# Patient Record
Sex: FEMALE | Race: WHITE | Employment: OTHER | URBAN - METROPOLITAN AREA
[De-identification: names, ages, dates, MRNs, and addresses within clinical notes are randomized per-mention and may not be internally consistent; named-entity substitution may affect disease eponyms.]

---

## 2017-01-27 ENCOUNTER — TELEPHONE (OUTPATIENT)
Dept: INTERNAL MEDICINE CLINIC | Age: 67
End: 2017-01-27

## 2017-01-27 DIAGNOSIS — Z11.59 NEED FOR HEPATITIS C SCREENING TEST: ICD-10-CM

## 2017-01-27 DIAGNOSIS — E78.00 PURE HYPERCHOLESTEROLEMIA: Primary | ICD-10-CM

## 2017-01-27 NOTE — TELEPHONE ENCOUNTER
Pt stated she would like an order for lab work mailed to her address on file before her appt on 02/20/17.  Best contact number [phone].        From answering service

## 2017-02-14 LAB
ALBUMIN SERPL-MCNC: 4.2 G/DL (ref 3.6–4.8)
ALBUMIN/GLOB SERPL: 1.9 {RATIO} (ref 1.1–2.5)
ALP SERPL-CCNC: 66 IU/L (ref 39–117)
ALT SERPL-CCNC: 23 IU/L (ref 0–32)
AST SERPL-CCNC: 23 IU/L (ref 0–40)
BILIRUB SERPL-MCNC: 0.6 MG/DL (ref 0–1.2)
BUN SERPL-MCNC: 11 MG/DL (ref 8–27)
BUN/CREAT SERPL: 22 (ref 11–26)
CALCIUM SERPL-MCNC: 9.2 MG/DL (ref 8.7–10.3)
CHLORIDE SERPL-SCNC: 99 MMOL/L (ref 96–106)
CHOLEST SERPL-MCNC: 170 MG/DL (ref 100–199)
CO2 SERPL-SCNC: 22 MMOL/L (ref 18–29)
CREAT SERPL-MCNC: 0.49 MG/DL (ref 0.57–1)
GLOBULIN SER CALC-MCNC: 2.2 G/DL (ref 1.5–4.5)
GLUCOSE SERPL-MCNC: 92 MG/DL (ref 65–99)
HCV AB S/CO SERPL IA: <0.1 S/CO RATIO (ref 0–0.9)
HDLC SERPL-MCNC: 77 MG/DL
INTERPRETATION, 910389: NORMAL
LDLC SERPL CALC-MCNC: 81 MG/DL (ref 0–99)
POTASSIUM SERPL-SCNC: 4.2 MMOL/L (ref 3.5–5.2)
PROT SERPL-MCNC: 6.4 G/DL (ref 6–8.5)
SODIUM SERPL-SCNC: 138 MMOL/L (ref 134–144)
TRIGL SERPL-MCNC: 62 MG/DL (ref 0–149)
VLDLC SERPL CALC-MCNC: 12 MG/DL (ref 5–40)

## 2017-02-20 ENCOUNTER — OFFICE VISIT (OUTPATIENT)
Dept: INTERNAL MEDICINE CLINIC | Age: 67
End: 2017-02-20

## 2017-02-20 VITALS
BODY MASS INDEX: 23.87 KG/M2 | WEIGHT: 139.8 LBS | HEART RATE: 71 BPM | RESPIRATION RATE: 17 BRPM | OXYGEN SATURATION: 98 % | TEMPERATURE: 98.6 F | SYSTOLIC BLOOD PRESSURE: 118 MMHG | HEIGHT: 64 IN | DIASTOLIC BLOOD PRESSURE: 72 MMHG

## 2017-02-20 DIAGNOSIS — N30.01 ACUTE CYSTITIS WITH HEMATURIA: ICD-10-CM

## 2017-02-20 DIAGNOSIS — E78.00 PURE HYPERCHOLESTEROLEMIA: Primary | ICD-10-CM

## 2017-02-20 LAB
BILIRUB UR QL STRIP: NEGATIVE
GLUCOSE UR-MCNC: NEGATIVE MG/DL
KETONES P FAST UR STRIP-MCNC: NEGATIVE MG/DL
PH UR STRIP: 6 [PH] (ref 4.6–8)
PROT UR QL STRIP: NEGATIVE MG/DL
SP GR UR STRIP: 1 (ref 1–1.03)
UA UROBILINOGEN AMB POC: NORMAL (ref 0.2–1)
URINALYSIS CLARITY POC: CLEAR
URINALYSIS COLOR POC: YELLOW
URINE BLOOD POC: NORMAL
URINE LEUKOCYTES POC: NEGATIVE
URINE NITRITES POC: NEGATIVE

## 2017-02-20 NOTE — MR AVS SNAPSHOT
Visit Information Date & Time Provider Department Dept. Phone Encounter #  
 2/20/2017 10:00 AM Arturo Billingsley MD [facility] [phone] [id number] Follow-up Instructions Return in about 6 months (around 8/20/2017) for hyperlipidemia. Follow-up and Disposition History Upcoming Health Maintenance Date Due  
 GLAUCOMA SCREENING Q2Y 7/9/2016 MEDICARE YEARLY EXAM 7/9/2016 COLONOSCOPY 2/15/2017 Pneumococcal 65+ Low/Medium Risk (2 of 2 - PCV13) 8/2/2017 BREAST CANCER SCRN MAMMOGRAM 10/10/2018 DTaP/Tdap/Td series (2 - Td) 8/5/2021 Allergies as of 2/20/2017  Review Complete On: 2/20/2017 By: Arturo Billingsley MD  
  
 Severity Noted Reaction Type Reactions Merthiolate (Thimerosal)  08/09/2012    Unknown (comments) Current Immunizations  Reviewed on 8/2/2016 Name Date Influenza Vaccine 10/15/2014, 10/22/2013 Pneumococcal Polysaccharide (PPSV-23) 8/2/2016 TDAP Vaccine 8/5/2011 Varicella Virus Vaccine Live 11/11/2011 Not reviewed this visit You Were Diagnosed With   
  
 Codes Comments Pure hypercholesterolemia    -  Primary ICD-10-CM: E78.00 ICD-9-CM: 272.0 Acute cystitis with hematuria     ICD-10-CM: N30.01 
ICD-9-CM: 595.0 Vitals BP Pulse Temp Resp Height(growth percentile) Weight(growth percentile) 118/72 (BP 1 Location: Left arm, BP Patient Position: Sitting) 71 98.6 °F (37 °C) (Oral) 17 5' 3.5\" (1.613 m) 139 lb 12.8 oz (63.4 kg) SpO2 BMI OB Status Smoking Status 98% 24.38 kg/m2 Postmenopausal Never Smoker Vitals History BMI and BSA Data Body Mass Index Body Surface Area  
 24.38 kg/m 2 1.69 m 2 Your Updated Medication List  
  
   
This list is accurate as of: 2/20/17 10:51 AM.  Always use your most recent med list.  
  
  
  
  
 B.infantis-B.ani-B.long-B.bifi 10-15 mg Tbec Take  by mouth.  
  
 bimatoprost 0.03 % eyelash solution Commonly known as:  LATISSE  
1 Drop by Base of Eyelashes route nightly. cetirizine 10 mg tablet Commonly known as:  ZYRTEC Take 1 Tab by mouth daily. fluticasone 50 mcg/actuation nasal spray Commonly known as:  FLONASE  
2 sprays by Both Nostrils route daily. melatonin Tab tablet Take  by mouth. MULTIVITAMIN PO Take  by mouth daily. NORITATE 1 % topical cream  
Generic drug:  metroNIDAZOLE Apply  to affected area as directed. Use a thin layer to affected areas after washing  
  
 raNITIdine 75 mg tablet Commonly known as:  ZANTAC Take 75 mg by mouth two (2) times a day. simvastatin 40 mg tablet Commonly known as:  ZOCOR  
TAKE 1 BY MOUTH NIGHTLY  
  
 spironolactone 100 mg tablet Commonly known as:  ALDACTONE Take 100 mg by mouth daily. VALTREX 500 mg tablet Generic drug:  valACYclovir Take 500 mg by mouth as needed. VANIQA 13.9 % topical cream  
Generic drug:  eflornithine Apply  to affected area as directed. apply thin layer to affected areas space application by 8 hour VITAMIN D3 1,000 unit Cap Generic drug:  cholecalciferol Take  by mouth. Taking 2 tablets 3 days a week and 1 tablet 4 days a week. We Performed the Following HEPATIC FUNCTION PANEL [35012 CPT(R)] LIPID PANEL [89003 CPT(R)] Follow-up Instructions Return in about 6 months (around 8/20/2017) for hyperlipidemia. [facility]! Dear Lea Nguyen: Thank you for requesting a USMD account. Our records indicate that you already have an active USMD account. You can access your account anytime at [email or web address]. Did you know that you can access your hospital and ER discharge instructions at any time in USMD? You can also review all of your test results from your hospital stay or ER visit. Additional Information If you have questions, please visit the Frequently Asked Questions section of the Boomrt website at [email or web address] Remember, Coiney is NOT to be used for urgent needs. For medical emergencies, dial 911. Now available from your iPhone and Android! Please provide this summary of care documentation to your next provider. Your primary care clinician is listed as ARCHIE NOYOLA. If you have any questions after today's visit, please call [phone].

## 2017-02-20 NOTE — PROGRESS NOTES
Results for orders placed or performed in visit on 02/20/17   AMB POC URINALYSIS DIP STICK AUTO W/O MICRO   Result Value Ref Range    Color (UA POC) Yellow     Clarity (UA POC) Clear     Glucose (UA POC) Negative Negative    Bilirubin (UA POC) Negative Negative    Ketones (UA POC) Negative Negative    Specific gravity (UA POC) 1.005 1.001 - 1.035    Blood (UA POC) Trace Negative    pH (UA POC) 6.0 4.6 - 8.0    Protein (UA POC) Negative Negative mg/dL    Urobilinogen (UA POC) 0.2 mg/dL 0.2 - 1    Nitrites (UA POC) Negative Negative    Leukocyte esterase (UA POC) Negative Negative

## 2017-02-20 NOTE — PROGRESS NOTES
Subjective:   Charlotte Funes is a 73 y.o. female who is seen for follow up of hyperlipidemia. Cardiovascular risk analysis - 73 y.o. female LDL goal is under 130. Compliance with treatment thus far has been good. ROS: taking medications as instructed, no medication side effects noted, no TIA's, no chest pain on exertion, no dyspnea on exertion, no swelling of ankles. New concerns:   Treated for UTI 2/10 at [facility].  E. Coli, given 7 days macrobid. Repeat urine today is normal.  Since then still with increased frequency. Nocturia x 1. No burning. .     Current Outpatient Prescriptions   Medication Sig Dispense Refill    ranitidine (ZANTAC) 75 mg tablet Take 75 mg by mouth two (2) times a day.  B.infantis-B.ani-B.long-B.bifi 10-15 mg TbEC Take  by mouth.  bimatoprost (LATISSE) 0.03 % eyelash solution 1 Drop by Base of Eyelashes route nightly.  simvastatin (ZOCOR) 40 mg tablet TAKE 1 BY MOUTH NIGHTLY 90 Tab 3    spironolactone (ALDACTONE) 100 mg tablet Take 100 mg by mouth daily. 1    fluticasone (FLONASE) 50 mcg/actuation nasal spray 2 sprays by Both Nostrils route daily. 3 Bottle 3    Cholecalciferol, Vitamin D3, (VITAMIN D3) 1,000 unit cap Take  by mouth. Taking 2 tablets 3 days a week and 1 tablet 4 days a week.  melatonin 5 mg Tab Take  by mouth.  cetirizine (ZYRTEC) 10 mg tablet Take 1 Tab by mouth daily. 30 Tab 0    valacyclovir (VALTREX) 500 mg tablet Take 500 mg by mouth as needed.  MULTIVITAMIN PO Take  by mouth daily.  metronidazole (NORITATE) 1 % topical cream Apply  to affected area as directed. Use a thin layer to affected areas after washing       eflornithine (VANIQA) 13.9 % topical cream Apply  to affected area as directed.  apply thin layer to affected areas space application by 8 hour         Lab Results   Component Value Date/Time    Cholesterol, total 170 01/27/2017 12:00 AM    HDL Cholesterol 77 01/27/2017 12:00 AM    LDL, calculated 81 01/27/2017 12:00 AM    Triglyceride 62 01/27/2017 12:00 AM       Lab Results   Component Value Date/Time    ALT (SGPT) 23 01/27/2017 12:00 AM    AST (SGOT) 23 01/27/2017 12:00 AM    Alk. phosphatase 66 01/27/2017 12:00 AM    Bilirubin, direct 0.19 08/18/2016 12:00 AM    Bilirubin, total 0.6 01/27/2017 12:00 AM       Lab Results   Component Value Date/Time    GFR est  01/27/2017 12:00 AM    GFR est non- 01/27/2017 12:00 AM    Creatinine 0.49 01/27/2017 12:00 AM    BUN 11 01/27/2017 12:00 AM    Sodium 138 01/27/2017 12:00 AM    Potassium 4.2 01/27/2017 12:00 AM    Chloride 99 01/27/2017 12:00 AM    CO2 22 01/27/2017 12:00 AM         Objective:     Visit Vitals    /72 (BP 1 Location: Left arm, BP Patient Position: Sitting)    Pulse 71    Temp 98.6 °F (37 °C) (Oral)    Resp 17    Ht 5' 3.5\" (1.613 m)    Wt 139 lb 12.8 oz (63.4 kg)    SpO2 98%    BMI 24.38 kg/m2      Appearance: alert, well appearing, and in no distress and oriented to person, place, and time. CVS exam   NECK: supple, no lad, no bruit, no tm  LUNGS: cta bilat  CV rrr, no m/g/r  ABD: soft, nt, nd, nabs  EXT: no c/c/e. Assessment/Plan:   Hyperlipidemia well controlled. current treatment plan is effective, no change in therapy. Recent UTI - resolved. ? Some OAB symptoms. Discussed. Does not want medications at this time. Orders Placed This Encounter    LIPID PANEL    HEPATIC FUNCTION PANEL     Follow-up Disposition:  Return in about 6 months (around 8/20/2017) for hyperlipidemia. Frank Saini

## 2017-03-07 RX ORDER — SIMVASTATIN 40 MG/1
TABLET, FILM COATED ORAL
Qty: 90 TAB | Refills: 3 | Status: SHIPPED | OUTPATIENT
Start: 2017-03-07 | End: 2018-01-08 | Stop reason: SDUPTHER

## 2017-07-19 RX ORDER — VALACYCLOVIR HYDROCHLORIDE 500 MG/1
500 TABLET, FILM COATED ORAL AS NEEDED
Qty: 90 TAB | Refills: 0 | Status: SHIPPED | OUTPATIENT
Start: 2017-07-19 | End: 2017-11-01 | Stop reason: SDUPTHER

## 2017-07-19 NOTE — TELEPHONE ENCOUNTER
Last seen: 02/20/2017    Requested Prescriptions     Pending Prescriptions Disp Refills    valACYclovir (VALTREX) 500 mg tablet       Sig: Take 1 Tab by mouth as needed.

## 2017-08-08 ENCOUNTER — OFFICE VISIT (OUTPATIENT)
Dept: INTERNAL MEDICINE CLINIC | Age: 67
End: 2017-08-08

## 2017-08-08 VITALS
DIASTOLIC BLOOD PRESSURE: 83 MMHG | WEIGHT: 129 LBS | HEIGHT: 63 IN | HEART RATE: 68 BPM | OXYGEN SATURATION: 97 % | BODY MASS INDEX: 22.86 KG/M2 | SYSTOLIC BLOOD PRESSURE: 129 MMHG | RESPIRATION RATE: 12 BRPM | TEMPERATURE: 97.9 F

## 2017-08-08 DIAGNOSIS — Z00.00 ROUTINE GENERAL MEDICAL EXAMINATION AT A HEALTH CARE FACILITY: Primary | ICD-10-CM

## 2017-08-08 RX ORDER — AZELASTINE 1 MG/ML
1 SPRAY, METERED NASAL 2 TIMES DAILY
COMMUNITY
End: 2018-02-14 | Stop reason: SDUPTHER

## 2017-08-08 NOTE — PROGRESS NOTES
Subjective:   78 y.o. female for Well Woman Check. Her gyne and breast care is done elsewhere by her Ob-Gyne physician. Last pap Feb or March 2017. Due colonoscopy in November - [facility] has retired, to have with Dr. Shelley Pedraza  UTSABINO mammogram and DXA fall 2016. Patient Active Problem List    Diagnosis Date Noted    Hyperlipidemia 11/11/2011    HSV (herpes simplex virus) infection 10/19/2011    Menopause 10/19/2011    Osteopenia 10/19/2011    Rosacea 10/19/2011     Current Outpatient Prescriptions   Medication Sig Dispense Refill    azelastine (ASTELIN) 137 mcg (0.1 %) nasal spray 1 Spray two (2) times a day. Use in each nostril as directed      valACYclovir (VALTREX) 500 mg tablet Take 1 Tab by mouth as needed. 90 Tab 0    simvastatin (ZOCOR) 40 mg tablet TAKE 1 BY MOUTH NIGHTLY 90 Tab 3    bimatoprost (LATISSE) 0.03 % eyelash solution 1 Drop by Base of Eyelashes route nightly.  spironolactone (ALDACTONE) 100 mg tablet Take 100 mg by mouth daily. 1    Cholecalciferol, Vitamin D3, (VITAMIN D3) 1,000 unit cap Take  by mouth. Taking 2 tablets 3 days a week and 1 tablet 4 days a week.  melatonin 5 mg Tab Take 3 mg by mouth.  cetirizine (ZYRTEC) 10 mg tablet Take 1 Tab by mouth daily. 30 Tab 0    MULTIVITAMIN PO Take  by mouth daily.  metronidazole (NORITATE) 1 % topical cream Apply  to affected area as directed. Use a thin layer to affected areas after washing       eflornithine (VANIQA) 13.9 % topical cream Apply  to affected area as directed.  apply thin layer to affected areas space application by 8 hour        Allergies   Allergen Reactions    Merthiolate (Thimerosal) Unknown (comments)     Past Medical History:   Diagnosis Date    Allergic rhinitis     GERD (gastroesophageal reflux disease)     HSV infection     Hypercholesterolemia     Osteopenia     Rosacea     Vitamin D deficiency      Past Surgical History:   Procedure Laterality Date    ENDOSCOPY, COLON, DIAGNOSTIC  2/2007, 2013    (Paige)-f/u 5 yrs. Family History   Problem Relation Age of Onset    Heart Disease Mother 48     CAD   Mammie Muster Hypertension Mother     Elevated Lipids Mother     High Cholesterol Mother     Heart Disease Father 48     CAD    Hypertension Father     Elevated Lipids Father     High Cholesterol Father     Liver Disease Paternal Grandfather      hemachromatosis     Social History   Substance Use Topics    Smoking status: Never Smoker    Smokeless tobacco: Never Used    Alcohol use 4.2 oz/week     7 Standard drinks or equivalent per week        Specific concerns today:   Had severe acid reflux. Cut out ETOH and GERD resolved. Was able to stop Zantac. Will now occ have 2 glasses of wine. Can awaken at night with reflux up esophagus. .    Review of Systems  Constitutional: negative except for weight loss with decreased wine  Eyes: negative  Ears, nose, mouth, throat, and face: negative  Respiratory: negative  Cardiovascular: negative  Gastrointestinal: negative except for reflux symptoms  Genitourinary:negative except for mild urge incontinence  Integument/breast: negative  Hematologic/lymphatic: negative  Musculoskeletal:negative  Neurological: negative  Behavioral/Psych: negative except for insomnia - melatonin helps. up at 3am to urinate at night - sometimes issues falling back asleep. Endocrine: negative  Allergic/Immunologic: negative except for allergies controlled with current medications. Objective:   Blood pressure 129/83, pulse 68, temperature 97.9 °F (36.6 °C), temperature source Oral, resp. rate 12, height 5' 3\" (1.6 m), weight 129 lb (58.5 kg), SpO2 97 %.    Physical Examination:   General appearance - alert, well appearing, and in no distress and oriented to person, place, and time  Mental status - alert, oriented to person, place, and time, normal mood, behavior, speech, dress, motor activity, and thought processes  Eyes - pupils equal and reactive, extraocular eye movements intact  Ears - bilateral TM's and external ear canals normal  Nose - normal and patent, no erythema, discharge or polyps  Mouth - mucous membranes moist, pharynx normal without lesions  Neck - supple, no significant adenopathy, carotids upstroke normal bilaterally, no bruits, thyroid exam: thyroid is normal in size without nodules or tenderness  Lymphatics - no palpable lymphadenopathy, no hepatosplenomegaly  Chest - clear to auscultation, no wheezes, rales or rhonchi, symmetric air entry  Heart - normal rate, regular rhythm, normal S1, S2, no murmurs, rubs, clicks or gallops  Abdomen - soft, nontender, nondistended, no masses or organomegaly  bowel sounds normal  Breasts - breasts appear normal, no suspicious masses, no skin or nipple changes or axillary nodes  Back exam - full range of motion, no tenderness, palpable spasm or pain on motion  Neurological - alert, oriented, normal speech, no focal findings or movement disorder noted  Musculoskeletal - no joint tenderness, deformity or swelling  Extremities - peripheral pulses normal, no pedal edema, no clubbing or cyanosis  Skin - normal coloration and turgor, no rashes, no suspicious skin lesions noted     Assessment/Plan:   79 yo female  Hyperlipidemia- controlled, cont same  GERD - discussed zantac prn, elevating head of bed and waiting 2-3 hours post eating to lay down. Allergic rhinitis - controlled with zyrtec daily and astelin ns prn. HM - check labs, rx for prevnar given. Colonoscopy in November.     call if any problems  Orders Placed This Encounter    METABOLIC PANEL, COMPREHENSIVE    LIPID PANEL    CBC W/O DIFF    VITAMIN D, 25 HYDROXY    TSH 3RD GENERATION    azelastine (ASTELIN) 137 mcg (0.1 %) nasal spray    pneumococcal 13 raeann conj dip (PREVNAR 13, PF,) 0.5 mL syrg injection     Follow-up Disposition: Not on File

## 2017-08-08 NOTE — PROGRESS NOTES
Ivon Gar is a 78 y.o. female who presents today physical     Chief Complaint   Patient presents with    Complete Physical         1. Have you been to the ER, urgent care clinic since your last visit? Hospitalized since your last visit? No     2. Have you seen or consulted any other health care providers outside of the [facility] since your last visit? Include any pap smears or colon screening.  No

## 2017-08-08 NOTE — MR AVS SNAPSHOT
Visit Information Date & Time Provider Department Dept. Phone Encounter #  
 8/8/2017  3:20 PM [address], MD [facility] [phone] [id number] Upcoming Health Maintenance Date Due  
 GLAUCOMA SCREENING Q2Y 7/9/2016 MEDICARE YEARLY EXAM 7/9/2016 COLONOSCOPY 2/15/2017 INFLUENZA AGE 9 TO ADULT 8/1/2017 Pneumococcal 65+ Low/Medium Risk (2 of 2 - PCV13) 8/2/2017 BREAST CANCER SCRN MAMMOGRAM 10/10/2018 DTaP/Tdap/Td series (2 - Td) 8/5/2021 Allergies as of 8/8/2017  Review Complete On: 8/8/2017 By: [address], [place]  
  
 Severity Noted Reaction Type Reactions Merthiolate (Thimerosal)  08/09/2012    Unknown (comments) Current Immunizations  Reviewed on 8/2/2016 Name Date Influenza Vaccine 10/15/2014, 10/22/2013 Pneumococcal Polysaccharide (PPSV-23) 8/2/2016 TDAP Vaccine 8/5/2011 Varicella Virus Vaccine Live 11/11/2011 Not reviewed this visit You Were Diagnosed With   
  
 Codes Comments Routine general medical examination at a health care facility    -  Primary ICD-10-CM: Z00.00 ICD-9-CM: V70.0 Vitals BP Pulse Temp Resp Height(growth percentile) Weight(growth percentile) 129/83 68 97.9 °F (36.6 °C) (Oral) 12 5' 3\" (1.6 m) 129 lb (58.5 kg) SpO2 BMI OB Status Smoking Status 97% 22.85 kg/m2 Postmenopausal Never Smoker Vitals History BMI and BSA Data Body Mass Index Body Surface Area  
 22.85 kg/m 2 1.61 m 2 [facility] [address] [facility] [phone] Your Updated Medication List  
  
   
This list is accurate as of: 8/8/17  4:07 PM.  Always use your most recent med list.  
  
  
  
  
 azelastine 137 mcg (0.1 %) nasal spray Commonly known as:  ASTELIN  
1 Spray two (2) times a day. Use in each nostril as directed  
  
 bimatoprost 0.03 % eyelash solution Commonly known as:  LATISSE  
1 Drop by Base of Eyelashes route nightly. cetirizine 10 mg tablet Commonly known as:  ZYRTEC Take 1 Tab by mouth daily. melatonin Tab tablet Take 3 mg by mouth. MULTIVITAMIN PO Take  by mouth daily. NORITATE 1 % topical cream  
Generic drug:  metroNIDAZOLE Apply  to affected area as directed. Use a thin layer to affected areas after washing  
  
 pneumococcal 13 raeann conj dip 0.5 mL Syrg injection Commonly known as:  PREVNAR 13 (PF)  
0.5 mL by IntraMUSCular route PRIOR TO DISCHARGE for 1 dose. simvastatin 40 mg tablet Commonly known as:  ZOCOR  
TAKE 1 BY MOUTH NIGHTLY  
  
 spironolactone 100 mg tablet Commonly known as:  ALDACTONE Take 100 mg by mouth daily. valACYclovir 500 mg tablet Commonly known as:  VALTREX Take 1 Tab by mouth as needed. VANIQA 13.9 % topical cream  
Generic drug:  eflornithine Apply  to affected area as directed. apply thin layer to affected areas space application by 8 hour VITAMIN D3 1,000 unit Cap Generic drug:  cholecalciferol Take  by mouth. Taking 2 tablets 3 days a week and 1 tablet 4 days a week. Prescriptions Printed Refills  
 pneumococcal 13 raeann conj dip (PREVNAR 13, PF,) 0.5 mL syrg injection 0 Si.5 mL by IntraMUSCular route PRIOR TO DISCHARGE for 1 dose. Class: Print Route: IntraMUSCular We Performed the Following CBC W/O DIFF [60468 CPT(R)] LIPID PANEL [23752 CPT(R)] METABOLIC PANEL, COMPREHENSIVE [36940 CPT(R)] TSH 3RD GENERATION [43575 CPT(R)] VITAMIN D, 25 HYDROXY [id number] CPT(R)] [facility]! Dear Miracle Holt: Thank you for requesting a WISHCLOUDS account. Our records indicate that you already have an active WISHCLOUDS account. You can access your account anytime at [email or web address] Did you know that you can access your hospital and ER discharge instructions at any time in 24tidy? You can also review all of your test results from your hospital stay or ER visit. Additional Information If you have questions, please visit the Frequently Asked Questions section of the 24tidy website at [email or web address] Remember, 24tidy is NOT to be used for urgent needs. For medical emergencies, dial 911. Now available from your iPhone and Android! Please provide this summary of care documentation to your next provider. Your primary care clinician is listed as ARCHIE NOYOLA. If you have any questions after today's visit, please call [phone].

## 2017-08-17 LAB
25(OH)D3+25(OH)D2 SERPL-MCNC: 37.2 NG/ML (ref 30–100)
ALBUMIN SERPL-MCNC: 4.4 G/DL (ref 3.6–4.8)
ALBUMIN/GLOB SERPL: 2.1 {RATIO} (ref 1.2–2.2)
ALP SERPL-CCNC: 74 IU/L (ref 39–117)
ALT SERPL-CCNC: 26 IU/L (ref 0–32)
AST SERPL-CCNC: 22 IU/L (ref 0–40)
BILIRUB SERPL-MCNC: 0.8 MG/DL (ref 0–1.2)
BUN SERPL-MCNC: 13 MG/DL (ref 8–27)
BUN/CREAT SERPL: 23 (ref 12–28)
CALCIUM SERPL-MCNC: 9.5 MG/DL (ref 8.7–10.3)
CHLORIDE SERPL-SCNC: 96 MMOL/L (ref 96–106)
CHOLEST SERPL-MCNC: 192 MG/DL (ref 100–199)
CO2 SERPL-SCNC: 23 MMOL/L (ref 18–29)
CREAT SERPL-MCNC: 0.56 MG/DL (ref 0.57–1)
ERYTHROCYTE [DISTWIDTH] IN BLOOD BY AUTOMATED COUNT: 14 % (ref 12.3–15.4)
GLOBULIN SER CALC-MCNC: 2.1 G/DL (ref 1.5–4.5)
GLUCOSE SERPL-MCNC: 88 MG/DL (ref 65–99)
HCT VFR BLD AUTO: 42.2 % (ref 34–46.6)
HDLC SERPL-MCNC: 79 MG/DL
HGB BLD-MCNC: 14.2 G/DL (ref 11.1–15.9)
INTERPRETATION, 910389: NORMAL
LDLC SERPL CALC-MCNC: 93 MG/DL (ref 0–99)
MCH RBC QN AUTO: 31.3 PG (ref 26.6–33)
MCHC RBC AUTO-ENTMCNC: 33.6 G/DL (ref 31.5–35.7)
MCV RBC AUTO: 93 FL (ref 79–97)
PLATELET # BLD AUTO: 218 X10E3/UL (ref 150–379)
POTASSIUM SERPL-SCNC: 4.3 MMOL/L (ref 3.5–5.2)
PROT SERPL-MCNC: 6.5 G/DL (ref 6–8.5)
RBC # BLD AUTO: 4.54 X10E6/UL (ref 3.77–5.28)
SODIUM SERPL-SCNC: 135 MMOL/L (ref 134–144)
TRIGL SERPL-MCNC: 99 MG/DL (ref 0–149)
TSH SERPL DL<=0.005 MIU/L-ACNC: 3.13 UIU/ML (ref 0.45–4.5)
VLDLC SERPL CALC-MCNC: 20 MG/DL (ref 5–40)
WBC # BLD AUTO: 4.1 X10E3/UL (ref 3.4–10.8)

## 2017-10-20 ENCOUNTER — HOSPITAL ENCOUNTER (OUTPATIENT)
Dept: MAMMOGRAPHY | Age: 67
Discharge: HOME | End: 2017-10-20
Attending: OBSTETRICS & GYNECOLOGY
Payer: COMMERCIAL

## 2017-10-20 DIAGNOSIS — Z12.31 VISIT FOR SCREENING MAMMOGRAM: ICD-10-CM

## 2017-10-20 PROCEDURE — 77067 SCR MAMMO BI INCL CAD: CPT

## 2017-11-01 RX ORDER — VALACYCLOVIR HYDROCHLORIDE 500 MG/1
500 TABLET, FILM COATED ORAL 2 TIMES DAILY
Qty: 90 TAB | Refills: 0 | Status: SHIPPED | OUTPATIENT
Start: 2017-11-01 | End: 2017-11-04

## 2018-01-08 RX ORDER — FINASTERIDE 5 MG/1
TABLET, FILM COATED ORAL
Refills: 3 | COMMUNITY
Start: 2017-10-31 | End: 2018-01-08 | Stop reason: SDUPTHER

## 2018-01-08 NOTE — TELEPHONE ENCOUNTER
Last seen: 08/08/2017      Requested Prescriptions     Pending Prescriptions Disp Refills    simvastatin (ZOCOR) 40 mg tablet 90 Tab 3    finasteride (PROSCAR) 5 mg tablet 90 Tab 3     Sig: TK 1 T PO  D

## 2018-01-09 RX ORDER — SIMVASTATIN 40 MG/1
TABLET, FILM COATED ORAL
Qty: 90 TAB | Refills: 3 | Status: SHIPPED | OUTPATIENT
Start: 2018-01-09 | End: 2018-01-15 | Stop reason: SDUPTHER

## 2018-01-09 RX ORDER — FINASTERIDE 5 MG/1
TABLET, FILM COATED ORAL
Qty: 90 TAB | Refills: 3 | Status: SHIPPED | OUTPATIENT
Start: 2018-01-09 | End: 2018-01-15 | Stop reason: SDUPTHER

## 2018-02-14 ENCOUNTER — OFFICE VISIT (OUTPATIENT)
Dept: INTERNAL MEDICINE CLINIC | Age: 68
End: 2018-02-14

## 2018-02-14 VITALS
WEIGHT: 137.2 LBS | TEMPERATURE: 98 F | RESPIRATION RATE: 17 BRPM | SYSTOLIC BLOOD PRESSURE: 129 MMHG | HEART RATE: 74 BPM | DIASTOLIC BLOOD PRESSURE: 72 MMHG | HEIGHT: 63 IN | OXYGEN SATURATION: 98 % | BODY MASS INDEX: 24.31 KG/M2

## 2018-02-14 DIAGNOSIS — E78.00 PURE HYPERCHOLESTEROLEMIA: Primary | ICD-10-CM

## 2018-02-14 RX ORDER — SIMVASTATIN 40 MG/1
TABLET, FILM COATED ORAL
Refills: 3 | COMMUNITY
Start: 2018-02-01 | End: 2018-10-25 | Stop reason: SDUPTHER

## 2018-02-14 RX ORDER — AZELASTINE 1 MG/ML
1 SPRAY, METERED NASAL 2 TIMES DAILY
Qty: 1 BOTTLE | Refills: 11 | Status: SHIPPED | OUTPATIENT
Start: 2018-02-14 | End: 2020-09-04 | Stop reason: ALTCHOICE

## 2018-02-14 NOTE — MR AVS SNAPSHOT
[address] [place] [address] [address] [facility] 
[phone] Patient: Harlie Dakin MRN: [id number] QPO:9/9/7070 Visit Information Date & Time Provider Department Dept. Phone Encounter #  
 2/14/2018 12:40 PM Manuel Membreno MD [facility] [phone] [id number] Your Appointments 8/13/2018  9:20 AM  
COMPLETE 40 with Manuel Membreno MD  
[facility] [address]) Appt Note: [address] [facility] [address]  
[facility] [address] [facility] [address] [facility] [phone] Upcoming Health Maintenance Date Due  
 GLAUCOMA SCREENING Q2Y 7/9/2016 MEDICARE YEARLY EXAM 7/9/2016 Influenza Age 5 to Adult 8/1/2017 Pneumococcal 65+ Low/Medium Risk (2 of 2 - PCV13) 8/2/2017 BREAST CANCER SCRN MAMMOGRAM 10/20/2019 DTaP/Tdap/Td series (2 - Td) 8/5/2021 COLONOSCOPY 11/13/2022 Allergies as of 2/14/2018  Review Complete On: 2/14/2018 By: Manuel Membreno MD  
  
 Severity Noted Reaction Type Reactions Merthiolate (Thimerosal)  08/09/2012    Unknown (comments) Current Immunizations  Reviewed on 2/14/2018 Name Date Influenza Vaccine 11/1/2017, 10/15/2014, 10/22/2013 Pneumococcal Polysaccharide (PPSV-23) 8/2/2016 TDAP Vaccine 8/5/2011 Varicella Virus Vaccine Live 11/11/2011 Reviewed by Manuel Membreno MD on 2/14/2018 at  1:22 PM  
You Were Diagnosed With   
  
 Codes Comments Pure hypercholesterolemia    -  Primary ICD-10-CM: E78.00 ICD-9-CM: 272.0 Vitals BP Pulse Temp Resp Height(growth percentile) Weight(growth percentile) 129/72 (BP 1 Location: Right arm, BP Patient Position: Sitting) 74 98 °F (36.7 °C) (Oral) 17 5' 3\" (1.6 m) 137 lb 3.2 oz (62.2 kg) SpO2 BMI OB Status Smoking Status 98% 24.3 kg/m2 Postmenopausal Never Smoker BMI and BSA Data Body Mass Index Body Surface Area  
 24.3 kg/m 2 1.66 m 2 Preferred Pharmacy Pharmacy Name Phone [id number]- [place], [place] - [address] AT [address] [place] [address] [phone] Your Updated Medication List  
  
   
This list is accurate as of: 18  1:33 PM.  Always use your most recent med list.  
  
  
  
  
 azelastine 137 mcg (0.1 %) nasal spray Commonly known as:  ASTELIN  
1 Spray by Both Nostrils route two (2) times a day. Use in each nostril as directed  
  
 bimatoprost 0.03 % eyelash solution Commonly known as:  LATISSE  
1 Drop by Base of Eyelashes route nightly. cetirizine 10 mg tablet Commonly known as:  ZYRTEC Take 1 Tab by mouth daily. finasteride 5 mg tablet Commonly known as:  PROSCAR TK 1 T PO  D  
  
 melatonin Tab tablet Take 3 mg by mouth. MULTIVITAMIN PO Take  by mouth daily. NORITATE 1 % topical cream  
Generic drug:  metroNIDAZOLE Apply  to affected area as directed. Use a thin layer to affected areas after washing  
  
 pneumococcal 13 raeann conj dip 0.5 mL Syrg injection Commonly known as:  PREVNAR 13 (PF)  
0.5 mL by IntraMUSCular route PRIOR TO DISCHARGE for 1 dose. simvastatin 40 mg tablet Commonly known as:  ZOCOR  
TAKE 1 TABLET BY MOUTH NIGHTLY  
  
 spironolactone 100 mg tablet Commonly known as:  ALDACTONE Take 100 mg by mouth daily. VANIQA 13.9 % topical cream  
Generic drug:  eflornithine Apply  to affected area as directed. apply thin layer to affected areas space application by 8 hour VITAMIN D3 1,000 unit Cap Generic drug:  cholecalciferol Take  by mouth. Taking 2 tablets 3 days a week and 1 tablet 4 days a week. Prescriptions Printed Refills  
 azelastine (ASTELIN) 137 mcg (0.1 %) nasal spray [address] [place] by Both Nostrils route two (2) times a day. Use in each nostril as directed Class: Print Route: Both Nostrils We Performed the Following HEPATIC FUNCTION PANEL [81451 CPT(R)] LIPID PANEL [41175 CPT(R)] Introducing [facility] SERVICES! Dear Henny Winter: Thank you for requesting a [employer] account. Our records indicate that you already have an active [employer] account. You can access your account anytime at [email or web address] Did you know that you can access your hospital and ER discharge instructions at any time in [employer]? You can also review all of your test results from your hospital stay or ER visit. Additional Information If you have questions, please visit the Frequently Asked Questions section of the [employer] website at [email or web address] Remember, [employer] is NOT to be used for urgent needs. For medical emergencies, dial 911. Now available from your iPhone and Android! Please provide this summary of care documentation to your next provider. Your primary care clinician is listed as ARCHIE NOYOLA. If you have any questions after today's visit, please call [phone].

## 2018-02-14 NOTE — PROGRESS NOTES
Subjective:   María Garcia is a 78 y.o. female who is seen for follow up of hyperlipidemia. Cardiovascular risk analysis - 78 y.o. female LDL goal is under 130. Compliance with treatment thus far has been good. ROS: taking medications as instructed, no medication side effects noted, no TIA's, no chest pain on exertion, no dyspnea on exertion, no swelling of ankles, no orthostatic dizziness or lightheadedness, no palpitations. New concerns:   Had colonoscopy completed in December - 1 polyp seen. Needs 5 year f/u. .     Current Outpatient Prescriptions   Medication Sig Dispense Refill    simvastatin (ZOCOR) 40 mg tablet TAKE 1 TABLET BY MOUTH NIGHTLY  3    finasteride (PROSCAR) 5 mg tablet TK 1 T PO  D 90 Tab 3    azelastine (ASTELIN) 137 mcg (0.1 %) nasal spray 1 Spray two (2) times a day. Use in each nostril as directed      bimatoprost (LATISSE) 0.03 % eyelash solution 1 Drop by Base of Eyelashes route nightly.  spironolactone (ALDACTONE) 100 mg tablet Take 100 mg by mouth daily. 1    Cholecalciferol, Vitamin D3, (VITAMIN D3) 1,000 unit cap Take  by mouth. Taking 2 tablets 3 days a week and 1 tablet 4 days a week.  melatonin 5 mg Tab Take 3 mg by mouth.  cetirizine (ZYRTEC) 10 mg tablet Take 1 Tab by mouth daily. 30 Tab 0    MULTIVITAMIN PO Take  by mouth daily.  metronidazole (NORITATE) 1 % topical cream Apply  to affected area as directed. Use a thin layer to affected areas after washing       eflornithine (VANIQA) 13.9 % topical cream Apply  to affected area as directed. apply thin layer to affected areas space application by 8 hour       pneumococcal 13 raeann conj dip (PREVNAR 13, PF,) 0.5 mL syrg injection 0.5 mL by IntraMUSCular route PRIOR TO DISCHARGE for 1 dose.  0.5 mL 0      Lab Results  Component Value Date/Time   Cholesterol, total 192 08/08/2017 12:00 AM   HDL Cholesterol 79 08/08/2017 12:00 AM   LDL, calculated 93 08/08/2017 12:00 AM   LDL-C, External 116 04/21/2015   Triglyceride 99 08/08/2017 12:00 AM     Lab Results  Component Value Date/Time   ALT (SGPT) 26 08/08/2017 12:00 AM   AST (SGOT) 22 08/08/2017 12:00 AM   Alk. phosphatase 74 08/08/2017 12:00 AM   Bilirubin, direct 0.19 08/18/2016 12:00 AM   Bilirubin, total 0.8 08/08/2017 12:00 AM   Albumin 4.4 08/08/2017 12:00 AM   Protein, total 6.5 08/08/2017 12:00 AM   PLATELET 837 35/13/9265 12:00 AM          Objective:     Visit Vitals    /72 (BP 1 Location: Right arm, BP Patient Position: Sitting)    Pulse 74    Temp 98 °F (36.7 °C) (Oral)    Resp 17    Ht 5' 3\" (1.6 m)    Wt 137 lb 3.2 oz (62.2 kg)    SpO2 98%    BMI 24.3 kg/m2      Appearance: alert, well appearing, and in no distress and oriented to person, place, and time. CVS exam   NECK: supple, no lad, no bruit, no tm  LUNGS: cta bilat  CV rrr, no m/g/r  ABD: soft, nt, nd, nabs  EXT: no c/c/e    Assessment/Plan:   Hyperlipidemia well controlled. current treatment plan is effective, no change in therapy. Orders Placed This Encounter    simvastatin (ZOCOR) 40 mg tablet     Follow-up Disposition: Not on File.  Has appointment in August for welcome to medicare PE.

## 2018-02-14 NOTE — PROGRESS NOTES
Chief Complaint   Patient presents with    Cholesterol Problem     not fasting      1. Have you been to the ER, urgent care clinic since your last visit? Hospitalized since your last visit? No    2. Have you seen or consulted any other health care providers outside of the [address] [place] [address] since your last visit? Include any pap smears or colon screening.  No

## 2018-02-26 ENCOUNTER — HOSPITAL ENCOUNTER (OUTPATIENT)
Dept: LAB | Age: 68
Discharge: HOME | End: 2018-02-26
Payer: MEDICARE

## 2018-02-26 PROCEDURE — 80076 HEPATIC FUNCTION PANEL: CPT

## 2018-02-26 PROCEDURE — 80061 LIPID PANEL: CPT

## 2018-02-26 PROCEDURE — 36415 COLL VENOUS BLD VENIPUNCTURE: CPT

## 2018-02-27 LAB
ALBUMIN SERPL-MCNC: 4.7 G/DL (ref 3.6–4.8)
ALP SERPL-CCNC: 71 IU/L (ref 39–117)
ALT SERPL-CCNC: 32 IU/L (ref 0–32)
AST SERPL-CCNC: 25 IU/L (ref 0–40)
BILIRUB DIRECT SERPL-MCNC: 0.2 MG/DL (ref 0–0.4)
BILIRUB SERPL-MCNC: 0.6 MG/DL (ref 0–1.2)
CHOLEST SERPL-MCNC: 187 MG/DL (ref 100–199)
HDLC SERPL-MCNC: 85 MG/DL
INTERPRETATION, 910389: NORMAL
LDLC SERPL CALC-MCNC: 87 MG/DL (ref 0–99)
PROT SERPL-MCNC: 6.9 G/DL (ref 6–8.5)
TRIGL SERPL-MCNC: 75 MG/DL (ref 0–149)
VLDLC SERPL CALC-MCNC: 15 MG/DL (ref 5–40)

## 2018-08-28 ENCOUNTER — OFFICE VISIT (OUTPATIENT)
Dept: INTERNAL MEDICINE CLINIC | Age: 68
End: 2018-08-28

## 2018-08-28 VITALS
DIASTOLIC BLOOD PRESSURE: 76 MMHG | HEART RATE: 75 BPM | SYSTOLIC BLOOD PRESSURE: 122 MMHG | RESPIRATION RATE: 16 BRPM | BODY MASS INDEX: 23.04 KG/M2 | TEMPERATURE: 96.2 F | HEIGHT: 63 IN | WEIGHT: 130 LBS | OXYGEN SATURATION: 98 %

## 2018-08-28 DIAGNOSIS — Z00.00 WELLNESS EXAMINATION: ICD-10-CM

## 2018-08-28 DIAGNOSIS — E78.00 PURE HYPERCHOLESTEROLEMIA: ICD-10-CM

## 2018-08-28 DIAGNOSIS — E55.9 VITAMIN D DEFICIENCY: ICD-10-CM

## 2018-08-28 DIAGNOSIS — H61.22 LEFT EAR IMPACTED CERUMEN: ICD-10-CM

## 2018-08-28 DIAGNOSIS — Z00.00 WELCOME TO MEDICARE PREVENTIVE VISIT: Primary | ICD-10-CM

## 2018-08-28 RX ORDER — VALACYCLOVIR HYDROCHLORIDE 500 MG/1
TABLET, FILM COATED ORAL
COMMUNITY
End: 2020-12-10 | Stop reason: SDUPTHER

## 2018-08-28 RX ORDER — CHOLECALCIFEROL (VITAMIN D3) 50 MCG
CAPSULE ORAL
COMMUNITY
End: 2020-02-27 | Stop reason: ALTCHOICE

## 2018-08-28 NOTE — PATIENT INSTRUCTIONS
Medicare Wellness Visit, Female The best way to live healthy is to have a lifestyle where you eat a well-balanced diet, exercise regularly, limit alcohol use, and quit all forms of tobacco/nicotine, if applicable. Regular preventive services are another way to keep healthy. Preventive services (vaccines, screening tests, monitoring & exams) can help personalize your care plan, which helps you manage your own care. Screening tests can find health problems at the earliest stages, when they are easiest to treat. Danilo Ball follows the current, evidence-based guidelines published by the [facility] Rhett Sada ([facility]) when recommending preventive services for our patients. Because we follow these guidelines, sometimes recommendations change over time as research supports it. (For example, mammograms used to be recommended annually. Even though Medicare will still pay for an annual mammogram, the newer guidelines recommend a mammogram every two years for women of average risk.) Of course, you and your doctor may decide to screen more often for some diseases, based on your risk and your health status. Preventive services for you include: - Medicare offers their members a free annual wellness visit, which is time for you and your primary care provider to discuss and plan for your preventive service needs. Take advantage of this benefit every year! 
-All adults over the age of 72 should receive the recommended pneumonia vaccines. Current USPSTF guidelines recommend a series of two vaccines for the best pneumonia protection.  
-All adults should have a flu vaccine yearly and a tetanus vaccine every 10 years. All adults age 61 and older should receive a shingles vaccine once in their lifetime.   
-A bone mass density test is recommended when a woman turns 65 to screen for osteoporosis. This test is only recommended one time, as a screening. Some providers will use this same test as a disease monitoring tool if you already have osteoporosis. -All adults age 38-68 who are overweight should have a diabetes screening test once every three years.  
-Other screening tests and preventive services for persons with diabetes include: an eye exam to screen for diabetic retinopathy, a kidney function test, a foot exam, and stricter control over your cholesterol.  
-Cardiovascular screening for adults with routine risk involves an electrocardiogram (ECG) at intervals determined by your doctor.  
-Colorectal cancer screenings should be done for adults age 54-65 with no increased risk factors for colorectal cancer. There are a number of acceptable methods of screening for this type of cancer. Each test has its own benefits and drawbacks. Discuss with your doctor what is most appropriate for you during your annual wellness visit. The different tests include: colonoscopy (considered the best screening method), a fecal occult blood test, a fecal DNA test, and sigmoidoscopy. -Breast cancer screenings are recommended every other year for women of normal risk, age 54-69. 
-Cervical cancer screenings for women over age 72 are only recommended with certain risk factors.  
-All adults born between [facility] should be screened once for Hepatitis C. Here is a list of your current Health Maintenance items (your personalized list of preventive services) with a due date: 
 
Health Maintenance Due Topic Date Due  MEDICARE YEARLY EXAM  08/28/2019  Influenza Age 5 to Adult  08/01/2018

## 2018-08-28 NOTE — MR AVS SNAPSHOT
[address] [place] [address] [address] [facility] 
[phone] Patient: Delmi Dillon MRN: [id number] SHILPA:[birth year] Visit Information Date & Time Provider Department Dept. Phone Encounter #  
 8/28/2018  8:40 AM Bradley Martínez MD [facility] [phone] [id number] Follow-up Instructions Return in about 6 months (around 2/28/2019) for hyperlipidemia. Upcoming Health Maintenance Date Due  
 MEDICARE YEARLY EXAM 3/14/2018 Influenza Age 5 to Adult 8/1/2018 BREAST CANCER SCRN MAMMOGRAM 10/20/2019 GLAUCOMA SCREENING Q2Y 11/2/2019 DTaP/Tdap/Td series (2 - Td) 8/5/2021 COLONOSCOPY 11/13/2022 Allergies as of 8/28/2018  Review Complete On: 8/28/2018 By: Bradley Martínez MD  
  
 Severity Noted Reaction Type Reactions Merthiolate (Thimerosal)  08/09/2012    Unknown (comments) Current Immunizations  Reviewed on 2/14/2018 Name Date Influenza Vaccine 11/1/2017, 10/15/2014, 10/22/2013 Pneumococcal Conjugate (PCV-13) 9/4/2017 Pneumococcal Polysaccharide (PPSV-23) 8/2/2016 TDAP Vaccine 8/5/2011 Varicella Virus Vaccine Live 11/11/2011 Not reviewed this visit You Were Diagnosed With   
  
 Codes Comments Welcome to Medicare preventive visit    -  Primary ICD-10-CM: Z00.00 ICD-9-CM: V70.0 Wellness examination     ICD-10-CM: Z00.00 ICD-9-CM: V70.0 Pure hypercholesterolemia     ICD-10-CM: E78.00 ICD-9-CM: 272.0 Vitamin D deficiency     ICD-10-CM: E55.9 ICD-9-CM: 268.9 Left ear impacted cerumen     ICD-10-CM: H61.22 
ICD-9-CM: 380.4 Vitals BP Pulse Temp Resp Height(growth percentile) Weight(growth percentile) 122/76 75 96.2 °F (35.7 °C) (Oral) 16 5' 3\" (1.6 m) 130 lb (59 kg) SpO2 BMI OB Status Smoking Status 98% 23.03 kg/m2 Postmenopausal Never Smoker Vitals History BMI and BSA Data Body Mass Index Body Surface Area 23.03 kg/m 2 1.62 m 2 Preferred Pharmacy Pharmacy Name Phone [facility]ianna Menezes [address] [place] [address] [address]. [address] [phone] Your Updated Medication List  
  
   
This list is accurate as of 8/28/18 10:33 AM.  Always use your most recent med list.  
  
  
  
  
 azelastine 137 mcg (0.1 %) nasal spray Commonly known as:  ASTELIN  
1 Spray by Both Nostrils route two (2) times a day. Use in each nostril as directed  
  
 bimatoprost 0.03 % eyelash solution Commonly known as:  LATISSE  
1 Drop by Base of Eyelashes route nightly. cetirizine 10 mg tablet Commonly known as:  ZYRTEC Take 1 Tab by mouth daily. finasteride 5 mg tablet Commonly known as:  PROSCAR TK 1 T PO  D  
  
 melatonin Tab tablet Take 3 mg by mouth. OTHER Nighttime elderberry syrup to help with insomnia PROBIOTIC 4X 10-15 mg Tbec Generic drug:  B.infantis-B.ani-B.long-B.bifi Take  by mouth. simvastatin 40 mg tablet Commonly known as:  ZOCOR  
TAKE 1 TABLET BY MOUTH NIGHTLY  
  
 spironolactone 100 mg tablet Commonly known as:  ALDACTONE Take 1 Tab by mouth daily. VALTREX 500 mg tablet Generic drug:  valACYclovir Take  by mouth. Take as needed for herpes simplex VANIQA 13.9 % topical cream  
Generic drug:  eflornithine Apply  to affected area as directed. apply thin layer to affected areas space application by 8 hour  
  
 varicella-zoster recombinant (PF) 50 mcg/0.5 mL Susr injection Commonly known as:  SHINGRIX (PF)  
0.5mL by IntraMUSCular route once now and then repeat in 2-6 months VITAMIN D3 1,000 unit Cap Generic drug:  cholecalciferol Take  by mouth. Taking 2 tablets 3 days a week and 1 tablet 4 days a week. Prescriptions Printed  Refills  
 varicella-zoster recombinant, PF, (SHINGRIX, PF,) 50 mcg/0.5 mL susr injection 1  
 Si.5mL by IntraMUSCular route once now and then repeat in 2-6 months Class: Print We Performed the Following AMB POC EKG ROUTINE W/ 12 LEADS, INTER & REP [18902 CPT(R)] LIPID PANEL [30074 CPT(R)] METABOLIC PANEL, COMPREHENSIVE [59195 CPT(R)] REMOVE IMPACTED EAR WAX [54719 CPT(R)] VITAMIN D, 25 HYDROXY [id number] CPT(R)] Follow-up Instructions Return in about 6 months (around 2019) for hyperlipidemia. Patient Instructions Medicare Wellness Visit, Female The best way to live healthy is to have a lifestyle where you eat a well-balanced diet, exercise regularly, limit alcohol use, and quit all forms of tobacco/nicotine, if applicable. Regular preventive services are another way to keep healthy. Preventive services (vaccines, screening tests, monitoring & exams) can help personalize your care plan, which helps you manage your own care. Screening tests can find health problems at the earliest stages, when they are easiest to treat. Danilo Ball follows the current, evidence-based guidelines published by the [facility] Rhett Sada ([facility]) when recommending preventive services for our patients. Because we follow these guidelines, sometimes recommendations change over time as research supports it. (For example, mammograms used to be recommended annually. Even though Medicare will still pay for an annual mammogram, the newer guidelines recommend a mammogram every two years for women of average risk.) Of course, you and your doctor may decide to screen more often for some diseases, based on your risk and your health status. Preventive services for you include: - Medicare offers their members a free annual wellness visit, which is time for you and your primary care provider to discuss and plan for your preventive service needs. Take advantage of this benefit every year! -All adults over the age of 72 should receive the recommended pneumonia vaccines. Current USPSTF guidelines recommend a series of two vaccines for the best pneumonia protection.  
-All adults should have a flu vaccine yearly and a tetanus vaccine every 10 years. All adults age 61 and older should receive a shingles vaccine once in their lifetime.   
-A bone mass density test is recommended when a woman turns 65 to screen for osteoporosis. This test is only recommended one time, as a screening. Some providers will use this same test as a disease monitoring tool if you already have osteoporosis. -All adults age 38-68 who are overweight should have a diabetes screening test once every three years.  
-Other screening tests and preventive services for persons with diabetes include: an eye exam to screen for diabetic retinopathy, a kidney function test, a foot exam, and stricter control over your cholesterol.  
-Cardiovascular screening for adults with routine risk involves an electrocardiogram (ECG) at intervals determined by your doctor.  
-Colorectal cancer screenings should be done for adults age 54-65 with no increased risk factors for colorectal cancer. There are a number of acceptable methods of screening for this type of cancer. Each test has its own benefits and drawbacks. Discuss with your doctor what is most appropriate for you during your annual wellness visit. The different tests include: colonoscopy (considered the best screening method), a fecal occult blood test, a fecal DNA test, and sigmoidoscopy. -Breast cancer screenings are recommended every other year for women of normal risk, age 54-69. 
-Cervical cancer screenings for women over age 72 are only recommended with certain risk factors.  
-All adults born between [facility] [place] should be screened once for Hepatitis C. Here is a list of your current Health Maintenance items (your personalized list of preventive services) with a due date: Health Maintenance Due Topic Date Due  MEDICARE YEARLY EXAM  08/28/2019  Influenza Age 5 to Adult  08/01/2018 Introducing [facility] & HEALTH SERVICES! Dear Sohail Swain: Thank you for requesting a [employer] account. Our records indicate that you already have an active [employer] account. You can access your account anytime at [email or web address]. [email or web address] Did you know that you can access your hospital and ER discharge instructions at any time in [employer]? You can also review all of your test results from your hospital stay or ER visit. Additional Information If you have questions, please visit the Frequently Asked Questions section of the [employer] website at [email or web address] Remember, [employer] is NOT to be used for urgent needs. For medical emergencies, dial 911. Now available from your iPhone and Android! Please provide this summary of care documentation to your next provider. Your primary care clinician is listed as ARCHIE NOYOLA. If you have any questions after today's visit, please call [phone].

## 2018-08-28 NOTE — PROGRESS NOTES
Health Maintenance Due Topic Date Due  MEDICARE YEARLY EXAM  03/14/2018  Influenza Age 5 to Adult  08/01/2018 Chief Complaint Patient presents with  Welcome To Medicare 1. Have you been to the ER, urgent care clinic since your last visit? Hospitalized since your last visit?no 2. Have you seen or consulted any other health care providers outside of the [facility] since your last visit? Include any pap smears or colon screening. no 
 
3) Do you have an Advance Directive on file?yes 4) Are you interested in receiving information on Advance Directives? no 
 
 
Patient is accompanied by self I have received verbal consent from Julia Thurston to discuss any/all medical information while they are present in the room.

## 2018-08-28 NOTE — PROGRESS NOTES
This is a \"Welcome to [employer]"  Initial Preventive Physical Examination (IPPE) providing Personalized Prevention Plan Services (Performed in the first 12 months of enrollment) I have reviewed the patient's medical history in detail and updated the computerized patient record. Waking up 2-3 times per night. ? Need to urinate. Will awake 3 or 3:30 and can have issues falling back asleep. Tried elderberry syrup to help her fall asleep. Will take melatonin 1.5mg prn. No napping during the day. No caffiene. Exercising regularly.  recently diagnosed with prostate cancer. UTD with Dr. Ronda Handy, spring 2018. Mammograms every fall History Past Medical History:  
Diagnosis Date  Allergic rhinitis  GERD (gastroesophageal reflux disease)  HSV infection  Hypercholesterolemia  Osteopenia  Rosacea  Vitamin D deficiency Past Surgical History:  
Procedure Laterality Date  ENDOSCOPY, COLON, DIAGNOSTIC  2/2007, 2013 ([facility])-f/u 5 yrs. Current Outpatient Prescriptions Medication Sig Dispense Refill  valACYclovir (VALTREX) 500 mg tablet Take  by mouth. Take as needed for herpes simplex  B.infantis-B.ani-B.long-B.bifi (PROBIOTIC 4X) 10-15 mg TbEC Take  by mouth.  OTHER Nighttime elderberry syrup to help with insomnia  varicella-zoster recombinant, PF, (SHINGRIX, PF,) 50 mcg/0.5 mL susr injection 0.5mL by IntraMUSCular route once now and then repeat in 2-6 months 0.5 mL 1  
 spironolactone (ALDACTONE) 100 mg tablet Take 1 Tab by mouth daily. 90 Tab 3  
 azelastine (ASTELIN) 137 mcg (0.1 %) nasal spray 1 [place] by Both Nostrils route two (2) times a day. Use in each nostril as directed 1 Bottle 11  
 simvastatin (ZOCOR) 40 mg tablet TAKE 1 TABLET BY MOUTH NIGHTLY  3  
 finasteride (PROSCAR) 5 mg tablet TK 1 T PO  D 90 Tab 3  Cholecalciferol, Vitamin D3, (VITAMIN D3) 1,000 unit cap Take  by mouth. Taking 2 tablets 3 days a week and 1 tablet 4 days a week.  melatonin 5 mg Tab Take 3 mg by mouth.  cetirizine (ZYRTEC) 10 mg tablet Take 1 Tab by mouth daily. 30 Tab 0  
 eflornithine (VANIQA) 13.9 % topical cream Apply  to affected area as directed. apply thin layer to affected areas space application by 8 hour  bimatoprost (LATISSE) 0.03 % eyelash solution 1 Drop by Base of Eyelashes route nightly. Allergies Allergen Reactions  Merthiolate (Thimerosal) Unknown (comments) Family History Problem Relation Age of Onset  Heart Disease Mother 48 CAD  Hypertension Mother  Elevated Lipids Mother  High Cholesterol Mother  Heart Disease Father 48 CAD  Hypertension Father  Elevated Lipids Father  High Cholesterol Father  Liver Disease Paternal Grandfather   
  hemachromatosis Social History Substance Use Topics  Smoking status: Never Smoker  Smokeless tobacco: Never Used  Alcohol use 4.2 oz/week 7 Standard drinks or equivalent per week Diet, Lifestyle: No special diet Exercise level: moderately active Depression Risk Screen PHQ over the last two weeks 8/28/2018 Little interest or pleasure in doing things Not at all Feeling down, depressed, irritable, or hopeless Not at all Total Score PHQ 2 0 Alcohol Risk Screen  
approx 2 drinks per day. Functional Ability and Level of Safety Hearing Loss Hearing is good. Vision Screening Vision is good. Wears glasses. Last exam approx fall 2017. No exam data present Activities of Daily Living The home contains: no safety equipment. Patient does total self care Fall Risk Screen Fall Risk Assessment, last 12 mths 8/28/2018 Able to walk? Yes Fall in past 12 months? No  
 
 
Abuse Screen Patient is not abused Review of Systems:  
Negative except : 
Weight loss of approx 10 lbs since shelter. Diet and exercise GERD is controlled without meds. Improved with less stress and smaller portions. Mild increase in urinary frequency. Mild urge incontinence Physical Examination:  
Visit Vitals  /76  Pulse 75  Temp 96.2 °F (35.7 °C) (Oral)  Resp 16  
 Ht 5' 3\" (1.6 m)  Wt 130 lb (59 kg)  SpO2 98%  BMI 23.03 kg/m2 General appearance - alert, well appearing, and in no distress Mental status - alert, oriented to person, place, and time Eyes - pupils equal and reactive, extraocular eye movements intact Ears - right ear normal, left ear with cerumen impaction Nose - normal and patent, no erythema, discharge or polyps Mouth - mucous membranes moist, pharynx normal without lesions Neck - supple, no significant adenopathy, carotids upstroke normal bilaterally, no bruits, thyroid exam: thyroid is normal in size without nodules or tenderness Lymphatics - no palpable lymphadenopathy, no hepatosplenomegaly Chest - clear to auscultation, no wheezes, rales or rhonchi, symmetric air entry Heart - normal rate, regular rhythm, normal S1, S2, no murmurs, rubs, clicks or gallops Abdomen - soft, nontender, nondistended, no masses or organomegaly Breasts - breasts appear normal, no suspicious masses, no skin or nipple changes or axillary nodes Back exam - full range of motion, no tenderness, palpable spasm or pain on motion Neurological - alert, oriented, normal speech, no focal findings or movement disorder noted Musculoskeletal - no joint tenderness, deformity or swelling Extremities - peripheral pulses normal, no pedal edema, no clubbing or cyanosis Skin - normal coloration and turgor, no rashes, no suspicious skin lesions noted Screening EKG EKG order placed: Yes Patient Care Team  
Patient Care Team: 
Sulma Smith MD as PCP - General (Internal Medicine) Mercedes Barrera, [place] as Physician (Optometry) End of Life Planning Advanced care planning directives were discussed with the patient and /or family/caregiver. Assessment/Plan Education and counseling provided: 
Are appropriate based on today's review and evaluation Diagnoses and all orders for this visit: 
 
1. Welcome to Medicare preventive visit 
-     varicella-zoster recombinant, PF, (SHINGRIX, PF,) 50 mcg/0.5 mL susr injection; 0.5mL by IntraMUSCular route once now and then repeat in 2-6 months 2. Wellness examination -     AMB POC EKG ROUTINE W/ 12 LEADS, INTER & REP 3. Pure hypercholesterolemia -     METABOLIC PANEL, COMPREHENSIVE 
-     LIPID PANEL 4. Vitamin D deficiency 
-     VITAMIN D, 25 HYDROXY 5. Left ear impacted cerumen -     REMOVE IMPACTED EAR WAX Health Maintenance Due Topic Date Due  MEDICARE YEARLY EXAM  08/28/2019  Influenza Age 5 to Adult  08/01/2018

## 2018-08-29 ENCOUNTER — HOSPITAL ENCOUNTER (OUTPATIENT)
Dept: LAB | Age: 68
Discharge: HOME | End: 2018-08-29
Payer: MEDICARE

## 2018-08-29 PROCEDURE — 80053 COMPREHEN METABOLIC PANEL: CPT

## 2018-08-29 PROCEDURE — 36415 COLL VENOUS BLD VENIPUNCTURE: CPT

## 2018-08-29 PROCEDURE — 82306 VITAMIN D 25 HYDROXY: CPT

## 2018-08-29 PROCEDURE — 80061 LIPID PANEL: CPT

## 2018-08-30 LAB
25(OH)D3+25(OH)D2 SERPL-MCNC: 32.2 NG/ML (ref 30–100)
ALBUMIN SERPL-MCNC: 4.3 G/DL (ref 3.6–4.8)
ALBUMIN/GLOB SERPL: 2 {RATIO} (ref 1.2–2.2)
ALP SERPL-CCNC: 64 IU/L (ref 39–117)
ALT SERPL-CCNC: 28 IU/L (ref 0–32)
AST SERPL-CCNC: 21 IU/L (ref 0–40)
BILIRUB SERPL-MCNC: 0.7 MG/DL (ref 0–1.2)
BUN SERPL-MCNC: 10 MG/DL (ref 8–27)
BUN/CREAT SERPL: 14 (ref 12–28)
CALCIUM SERPL-MCNC: 9.8 MG/DL (ref 8.7–10.3)
CHLORIDE SERPL-SCNC: 97 MMOL/L (ref 96–106)
CHOLEST SERPL-MCNC: 166 MG/DL (ref 100–199)
CO2 SERPL-SCNC: 23 MMOL/L (ref 20–29)
CREAT SERPL-MCNC: 0.71 MG/DL (ref 0.57–1)
GLOBULIN SER CALC-MCNC: 2.1 G/DL (ref 1.5–4.5)
GLUCOSE SERPL-MCNC: 104 MG/DL (ref 65–99)
HDLC SERPL-MCNC: 86 MG/DL
INTERPRETATION, 910389: NORMAL
LDLC SERPL CALC-MCNC: 67 MG/DL (ref 0–99)
POTASSIUM SERPL-SCNC: 4.7 MMOL/L (ref 3.5–5.2)
PROT SERPL-MCNC: 6.4 G/DL (ref 6–8.5)
SODIUM SERPL-SCNC: 135 MMOL/L (ref 134–144)
TRIGL SERPL-MCNC: 63 MG/DL (ref 0–149)
VLDLC SERPL CALC-MCNC: 13 MG/DL (ref 5–40)

## 2018-09-10 ENCOUNTER — TELEPHONE (OUTPATIENT)
Dept: INTERNAL MEDICINE CLINIC | Age: 68
End: 2018-09-10

## 2018-09-10 RX ORDER — TOLTERODINE 4 MG/1
4 CAPSULE, EXTENDED RELEASE ORAL DAILY
Qty: 30 CAP | Refills: 5 | Status: SHIPPED | OUTPATIENT
Start: 2018-09-10 | End: 2019-02-26 | Stop reason: ALTCHOICE

## 2018-09-10 NOTE — TELEPHONE ENCOUNTER
----- Message from Jeevan Baires LPN sent at   7:44 AM EDT -----  Regarding: FW: Non-Urgent Medical Question  Contact: [phone]      ----- Message -----     From: Farideh Cisneros     Sent: 2018  11:23 AM       To: Valerie Garcias [facility]  Subject: Non-Urgent Medical Question                      Dr. Vinicio Landin,  during my last appointment you mentioned a prescription that would eliminate the urge to urinate frequently. Could you call in that prescription to my  pharmacy? [facility]eyr: [address] [facility] [address], [phone].   Thanks so much, Reanna Beasley

## 2018-11-26 ENCOUNTER — HOSPITAL ENCOUNTER (OUTPATIENT)
Dept: MAMMOGRAPHY | Age: 68
Discharge: HOME | End: 2018-11-26
Attending: OBSTETRICS & GYNECOLOGY
Payer: MEDICARE

## 2018-11-26 DIAGNOSIS — Z12.39 SCREENING BREAST EXAMINATION: ICD-10-CM

## 2018-11-26 PROCEDURE — 77067 SCR MAMMO BI INCL CAD: CPT

## 2019-01-09 RX ORDER — FINASTERIDE 5 MG/1
TABLET, FILM COATED ORAL
Qty: 90 TAB | Refills: 3 | Status: SHIPPED | OUTPATIENT
Start: 2019-01-09 | End: 2019-12-11 | Stop reason: SDUPTHER

## 2019-02-22 RX ORDER — SPIRONOLACTONE 100 MG/1
100 TABLET, FILM COATED ORAL DAILY
Qty: 90 TAB | Refills: 3 | Status: SHIPPED | OUTPATIENT
Start: 2019-02-22 | End: 2020-02-14

## 2019-02-26 ENCOUNTER — OFFICE VISIT (OUTPATIENT)
Dept: INTERNAL MEDICINE CLINIC | Age: 69
End: 2019-02-26

## 2019-02-26 VITALS
OXYGEN SATURATION: 99 % | SYSTOLIC BLOOD PRESSURE: 136 MMHG | DIASTOLIC BLOOD PRESSURE: 67 MMHG | BODY MASS INDEX: 24.13 KG/M2 | HEIGHT: 63 IN | WEIGHT: 136.2 LBS | HEART RATE: 73 BPM | TEMPERATURE: 98.7 F

## 2019-02-26 DIAGNOSIS — E78.00 PURE HYPERCHOLESTEROLEMIA: Primary | ICD-10-CM

## 2019-02-26 DIAGNOSIS — F51.01 PRIMARY INSOMNIA: ICD-10-CM

## 2019-02-26 NOTE — PROGRESS NOTES
Visit Vitals /67 Pulse 73 Temp 98.7 °F (37.1 °C) (Oral) Ht 5' 3\" (1.6 m) Wt 136 lb 3.2 oz (61.8 kg) SpO2 99% BMI 24.13 kg/m² 1. Have you been to the ER, urgent care clinic since your last visit? Hospitalized since your last visit? no 
 
2. Have you seen or consulted any other health care providers outside of the [address] [place] [address] since your last visit? Include any pap smears or colon screening. Mammogram done Nov 2018. Patient states she received Prevnar 13 vaccine 2018, flu vaccine 2018.

## 2019-02-26 NOTE — PROGRESS NOTES
Subjective:  
Emiliano Paredes is a 80 y.o. female who is seen for follow up of hyperlipidemia. Cardiovascular risk analysis - 80 y.o. female LDL goal is under 130. Compliance with treatment thus far has been good. ROS: taking medications as instructed, no medication side effects noted, no TIA's, no chest pain on exertion, no dyspnea on exertion, no swelling of ankles, no orthostatic dizziness or lightheadedness, no palpitations. New concerns:  
Had intermittent pain mid back between shoulder blades and worse on the left side. Worse with movement. Lasted intermittently x 3 weeks and then resolved. 1 day had 3 sharp pains from left chest to neck while reading paper. Has not had since. Exercising at least 166 minutes on treadmill each week without issues. Continued issues sleeping. Will take melatonin 3-6mg at night - will take extra dose 3 am.  Using [place] [employer], elderberry tea. Feels doesn't get good consistent sleep. Current Outpatient Medications Medication Sig Dispense Refill  spironolactone (ALDACTONE) 100 mg tablet TAKE 1 TAB BY MOUTH DAILY. 90 Tab 3  
 finasteride (PROSCAR) 5 mg tablet TAKE 1 TABLET BY MOUTH EVERY DAY 90 Tab 3  
 simvastatin (ZOCOR) 40 mg tablet TAKE 1 TABLET BY MOUTH NIGHTLY 90 Tab 3  
 valACYclovir (VALTREX) 500 mg tablet Take  by mouth. Take as needed for herpes simplex  B.infantis-B.ani-B.long-B.bifi (PROBIOTIC 4X) 10-15 mg TbEC Take  by mouth.  OTHER Nighttime elderberry syrup to help with insomnia    
 azelastine (ASTELIN) 137 mcg (0.1 %) nasal spray 1 [place] by Both Nostrils route two (2) times a day. Use in each nostril as directed 1 Bottle 11  Cholecalciferol, Vitamin D3, (VITAMIN D3) 1,000 unit cap Take  by mouth. Taking 2 tablets 3 days a week and 1 tablet 4 days a week.  melatonin 5 mg Tab Take 3 mg by mouth.  cetirizine (ZYRTEC) 10 mg tablet Take 1 Tab by mouth daily.  30 Tab 0  
  varicella-zoster recombinant, PF, (SHINGRIX, PF,) 50 mcg/0.5 mL susr injection 0.5mL by IntraMUSCular route once now and then repeat in 2-6 months 0.5 mL 1 Lab Results Component Value Date/Time Cholesterol, total 166 08/29/2018 08:44 AM  
 HDL Cholesterol 86 08/29/2018 08:44 AM  
 LDL, calculated 67 08/29/2018 08:44 AM  
 LDL-C, External 116 04/21/2015 Triglyceride 63 08/29/2018 08:44 AM  
 
Lab Results Component Value Date/Time ALT (SGPT) 28 08/29/2018 08:44 AM  
 AST (SGOT) 21 08/29/2018 08:44 AM  
 Alk. phosphatase 64 08/29/2018 08:44 AM  
 Bilirubin, direct 0.20 02/26/2018 08:24 AM  
 Bilirubin, total 0.7 08/29/2018 08:44 AM  
 Albumin 4.3 08/29/2018 08:44 AM  
 Protein, total 6.4 08/29/2018 08:44 AM  
 PLATELET 134 88/42/7817 12:00 AM  
  
 
Objective:  
 
Visit Vitals /67 Pulse 73 Temp 98.7 °F (37.1 °C) (Oral) Ht 5' 3\" (1.6 m) Wt 136 lb 3.2 oz (61.8 kg) SpO2 99% BMI 24.13 kg/m² Appearance: alert, well appearing, and in no distress and oriented to person, place, and time. CVS exam  
.NECK: supple, no lad, no bruit, no tm LUNGS: cta bilat CV rrr, no m/g/r ABD: soft, nt, nd, nabs EXT: no c/c/e BACK: - thoracic  Spine and rhomboid muscles nontender Assessment/Plan: Hyperlipidemia well controlled. current treatment plan is effective, no change in therapy. Mid back pain - resolved. Muscular. Insomnia - discussed 6mg melatonin every night at bedtime. If no help would consider Trazodone. Orders Placed This Encounter  LIPID PANEL  
 HEPATIC FUNCTION PANEL Follow-up Disposition: 
Return in about 6 months (around 8/26/2019) for hyperlipidemia. Aydin Sandoval

## 2019-03-19 ENCOUNTER — HOSPITAL ENCOUNTER (OUTPATIENT)
Dept: LAB | Age: 69
Discharge: HOME | End: 2019-03-19
Payer: MEDICARE

## 2019-03-19 PROCEDURE — 80076 HEPATIC FUNCTION PANEL: CPT

## 2019-03-19 PROCEDURE — 36415 COLL VENOUS BLD VENIPUNCTURE: CPT

## 2019-03-19 PROCEDURE — 80061 LIPID PANEL: CPT

## 2019-03-20 LAB
ALBUMIN SERPL-MCNC: 4.3 G/DL (ref 3.6–4.8)
ALP SERPL-CCNC: 67 IU/L (ref 39–117)
ALT SERPL-CCNC: 23 IU/L (ref 0–32)
AST SERPL-CCNC: 19 IU/L (ref 0–40)
BILIRUB DIRECT SERPL-MCNC: 0.12 MG/DL (ref 0–0.4)
BILIRUB SERPL-MCNC: 0.5 MG/DL (ref 0–1.2)
CHOLEST SERPL-MCNC: 191 MG/DL (ref 100–199)
HDLC SERPL-MCNC: 84 MG/DL
INTERPRETATION, 910389: NORMAL
LDLC SERPL CALC-MCNC: 93 MG/DL (ref 0–99)
PROT SERPL-MCNC: 6.5 G/DL (ref 6–8.5)
TRIGL SERPL-MCNC: 68 MG/DL (ref 0–149)
VLDLC SERPL CALC-MCNC: 14 MG/DL (ref 5–40)

## 2019-05-08 ENCOUNTER — TELEPHONE (OUTPATIENT)
Dept: INTERNAL MEDICINE CLINIC | Age: 69
End: 2019-05-08

## 2019-05-08 RX ORDER — TRAZODONE HYDROCHLORIDE 50 MG/1
50 TABLET ORAL
Qty: 30 TAB | Refills: 3 | Status: SHIPPED | OUTPATIENT
Start: 2019-05-08 | End: 2019-06-03 | Stop reason: SDUPTHER

## 2019-05-08 NOTE — TELEPHONE ENCOUNTER
Regarding: FW: Prescription Question  Contact: [phone]      ----- Message -----  From: Raffi Dooley  Sent: 5/8/2019   9:24 AM  To: [facility] Nurse Pool  Subject: Prescription Question                            ----- Message from [address] [place] [address] [address], Generic sent at 5/8/2019  9:24 AM EDT -----    Would you please prescribe Tazodone for me? Lack of sleep is too frustrating. .. [facility]  [phone]. Thank you.

## 2019-05-08 NOTE — TELEPHONE ENCOUNTER
Regarding: FW: Prescription Question  Contact: [phone]      ----- Message -----  From: Aliza Johnson  Sent: 5/8/2019   9:24 AM  To: Timothy Watters  Subject: Prescription Question                            ----- Message from [place], Generic sent at 5/8/2019  9:24 AM EDT -----    Would you please prescribe Tazodone for me? Lack of sleep is too frustrating. .. [facility]  [phone]. Thank you.

## 2019-06-03 ENCOUNTER — TELEPHONE (OUTPATIENT)
Dept: INTERNAL MEDICINE CLINIC | Age: 69
End: 2019-06-03

## 2019-06-03 RX ORDER — TRAZODONE HYDROCHLORIDE 50 MG/1
50 TABLET ORAL
Qty: 90 TAB | Refills: 3 | Status: SHIPPED | OUTPATIENT
Start: 2019-06-03 | End: 2020-05-27

## 2019-07-08 ENCOUNTER — OFFICE VISIT (OUTPATIENT)
Dept: INTERNAL MEDICINE CLINIC | Age: 69
End: 2019-07-08

## 2019-07-08 VITALS
WEIGHT: 133 LBS | BODY MASS INDEX: 23.57 KG/M2 | HEIGHT: 63 IN | OXYGEN SATURATION: 99 % | SYSTOLIC BLOOD PRESSURE: 139 MMHG | TEMPERATURE: 98.4 F | DIASTOLIC BLOOD PRESSURE: 79 MMHG | HEART RATE: 77 BPM

## 2019-07-08 DIAGNOSIS — J30.2 SEASONAL ALLERGIC RHINITIS, UNSPECIFIED TRIGGER: ICD-10-CM

## 2019-07-08 DIAGNOSIS — N60.02 BREAST CYST, LEFT: ICD-10-CM

## 2019-07-08 DIAGNOSIS — R19.7 DIARRHEA, UNSPECIFIED TYPE: Primary | ICD-10-CM

## 2019-07-08 NOTE — PROGRESS NOTES
Chief Complaint   Patient presents with    Other     evaluate left breast    Diarrhea     on and off nausea, exhausted x 3 weeks    Cold Symptoms     sneezing, runny nose, congestion on and off      Visit Vitals  /79   Pulse 77   Temp 98.4 °F (36.9 °C) (Oral)   Ht 5' 3\" (1.6 m)   Wt 133 lb (60.3 kg)   SpO2 99%   BMI 23.56 kg/m²     1. Have you been to the ER, urgent care clinic since your last visit? Hospitalized since your last visit? no    2. Have you seen or consulted any other health care providers outside of the [address] [place] [address] since your last visit? Include any pap smears or colon screening.  no

## 2019-07-08 NOTE — PATIENT INSTRUCTIONS
Continue Astelin nasal spray Add back Zyrtec (cetirizine) 10mg daily Start Saline nasal spray 2 squirts each nostril 2-3 times daily. For diarrhea - Stop dairy for 2 weeks [place] diet - not spicy, acidic or fatty. Decrease fresh fruits and vegetables.

## 2019-07-08 NOTE — PROGRESS NOTES
HISTORY OF PRESENT ILLNESS  Marylene Gehrig is a 80 y.o. female. Small raisin sized blister on her left breast next to nipple. Also left nipple is usually inverted but now not as inverted with red area. Noticed yesterday. Nontender. No new bras or bathing suits to cause irritation. Diarrhea x 3 weeks. Watery without blood or dark black stools. Going twice a day. Started with feeling poorly x 2 days - exhausted without energy. Then slowly improved with energy level but still fatigued. Also with nausea intermittently. Abdominal cramping. started over the weekend. No fevers of chills. Had not taken any medications or adjusted her diet until this weekend when she took some Peptobismal.  Helped. No BM on Sunday. Had formed Bm this am but then diarrhea returned. No recent travel or well water.  without diarrhea. She is down approx 3 lbs. Appetite is normal.  2 days ago stopped probiotic. Cold x 1 week with lots of sneezing. Mild cough productive of mucous. Gar Opal is green. Using her Flonase ns. Current Outpatient Medications:     traZODone (DESYREL) 50 mg tablet, Take 1 Tab by mouth nightly., Disp: 90 Tab, Rfl: 3    spironolactone (ALDACTONE) 100 mg tablet, TAKE 1 TAB BY MOUTH DAILY. , Disp: 90 Tab, Rfl: 3    finasteride (PROSCAR) 5 mg tablet, TAKE 1 TABLET BY MOUTH EVERY DAY, Disp: 90 Tab, Rfl: 3    simvastatin (ZOCOR) 40 mg tablet, TAKE 1 TABLET BY MOUTH NIGHTLY, Disp: 90 Tab, Rfl: 3    valACYclovir (VALTREX) 500 mg tablet, Take  by mouth. Take as needed for herpes simplex, Disp: , Rfl:     OTHER, Nighttime elderberry syrup to help with insomnia, Disp: , Rfl:     azelastine (ASTELIN) 137 mcg (0.1 %) nasal spray, 1 [place] by Both Nostrils route two (2) times a day. Use in each nostril as directed, Disp: 1 Bottle, Rfl: 11    Cholecalciferol, Vitamin D3, (VITAMIN D3) 1,000 unit cap, Take  by mouth.  Taking 2 tablets 3 days a week and 1 tablet 4 days a week., Disp: , Rfl:   melatonin 5 mg Tab, Take 3 mg by mouth., Disp: , Rfl:     B.infantis-B.ani-B.long-B.bifi (PROBIOTIC 4X) 10-15 mg TbEC, Take  by mouth., Disp: , Rfl:     varicella-zoster recombinant, PF, (SHINGRIX, PF,) 50 mcg/0.5 mL susr injection, 0.5mL by IntraMUSCular route once now and then repeat in 2-6 months, Disp: 0.5 mL, Rfl: 1    cetirizine (ZYRTEC) 10 mg tablet, Take 1 Tab by mouth daily. , Disp: 30 Tab, Rfl: 0    Visit Vitals  /79   Pulse 77   Temp 98.4 °F (36.9 °C) (Oral)   Ht 5' 3\" (1.6 m)   Wt 133 lb (60.3 kg)   SpO2 99%   BMI 23.56 kg/m²       ROS  See above  Physical Exam   Constitutional: She appears well-developed and well-nourished. HENT:   Head: Normocephalic and atraumatic. Right Ear: External ear normal.   Left Ear: External ear normal.   Nares - edematous with clear discharge  OP - post nasal drainage   Neck: Neck supple. No thyromegaly present. Cardiovascular: Normal rate, regular rhythm and normal heart sounds. Exam reveals no gallop and no friction rub. No murmur heard. Pulmonary/Chest: Effort normal and breath sounds normal. Right breast exhibits inverted nipple. Right breast exhibits no mass, no nipple discharge, no skin change and no tenderness. Left breast exhibits inverted nipple and skin change. Left breast exhibits no mass, no nipple discharge and no tenderness. No axillary lad   Abdominal: Soft. Bowel sounds are normal. She exhibits no distension and no mass. There is no tenderness. Musculoskeletal: She exhibits no edema. Lymphadenopathy:     She has no cervical adenopathy. Vitals reviewed. ASSESSMENT and PLAN  Diarrhea - chronic x 3 weeks and  Worsening - discussed bland diet, check stool studies  Allergic rhinitis - restart zyrtec, continue Astelin. Add saline ns. Subcutaneous cyst left nipple - will watch. If worsens consider imaging. Nothing to drain at this point.     Orders Placed This Encounter    CULTURE, STOOL    CBC WITH AUTOMATED DIFF    METABOLIC PANEL, COMPREHENSIVE    C DIFFICILE TOXIN GENE, [place]RISTIANA    GIARDIA/CRYPTOSPORIDIUM EIA

## 2019-07-10 ENCOUNTER — HOSPITAL ENCOUNTER (OUTPATIENT)
Dept: LAB | Age: 69
Discharge: HOME | End: 2019-07-10
Payer: MEDICARE

## 2019-07-10 PROCEDURE — 36415 COLL VENOUS BLD VENIPUNCTURE: CPT

## 2019-07-10 PROCEDURE — 87427 SHIGA-LIKE TOXIN AG IA: CPT

## 2019-07-10 PROCEDURE — 85025 COMPLETE CBC W/AUTO DIFF WBC: CPT

## 2019-07-10 PROCEDURE — 87329 GIARDIA AG IA: CPT

## 2019-07-10 PROCEDURE — 80053 COMPREHEN METABOLIC PANEL: CPT

## 2019-07-10 PROCEDURE — 87493 C DIFF AMPLIFIED PROBE: CPT

## 2019-07-11 LAB
ALBUMIN SERPL-MCNC: 4.4 G/DL (ref 3.6–4.8)
ALBUMIN/GLOB SERPL: 2.1 {RATIO} (ref 1.2–2.2)
ALP SERPL-CCNC: 57 IU/L (ref 39–117)
ALT SERPL-CCNC: 28 IU/L (ref 0–32)
AST SERPL-CCNC: 26 IU/L (ref 0–40)
BASOPHILS # BLD AUTO: 0 X10E3/UL (ref 0–0.2)
BASOPHILS NFR BLD AUTO: 1 %
BILIRUB SERPL-MCNC: 0.5 MG/DL (ref 0–1.2)
BUN SERPL-MCNC: 17 MG/DL (ref 8–27)
BUN/CREAT SERPL: 18 (ref 12–28)
C DIFF TOX GENS STL QL NAA+PROBE: NEGATIVE
CALCIUM SERPL-MCNC: 9.3 MG/DL (ref 8.7–10.3)
CHLORIDE SERPL-SCNC: 100 MMOL/L (ref 96–106)
CO2 SERPL-SCNC: 21 MMOL/L (ref 20–29)
CREAT SERPL-MCNC: 0.96 MG/DL (ref 0.57–1)
CRYPTOSP AG STL QL IA: NEGATIVE
EOSINOPHIL # BLD AUTO: 0 X10E3/UL (ref 0–0.4)
EOSINOPHIL NFR BLD AUTO: 1 %
ERYTHROCYTE [DISTWIDTH] IN BLOOD BY AUTOMATED COUNT: 13.2 % (ref 12.3–15.4)
G LAMBLIA AG STL QL IA: NEGATIVE
GLOBULIN SER CALC-MCNC: 2.1 G/DL (ref 1.5–4.5)
GLUCOSE SERPL-MCNC: 104 MG/DL (ref 65–99)
HCT VFR BLD AUTO: 40.5 % (ref 34–46.6)
HGB BLD-MCNC: 13.6 G/DL (ref 11.1–15.9)
IMM GRANULOCYTES # BLD AUTO: 0 X10E3/UL (ref 0–0.1)
IMM GRANULOCYTES NFR BLD AUTO: 0 %
LYMPHOCYTES # BLD AUTO: 1.4 X10E3/UL (ref 0.7–3.1)
LYMPHOCYTES NFR BLD AUTO: 22 %
MCH RBC QN AUTO: 31.6 PG (ref 26.6–33)
MCHC RBC AUTO-ENTMCNC: 33.6 G/DL (ref 31.5–35.7)
MCV RBC AUTO: 94 FL (ref 79–97)
MONOCYTES # BLD AUTO: 0.5 X10E3/UL (ref 0.1–0.9)
MONOCYTES NFR BLD AUTO: 9 %
NEUTROPHILS # BLD AUTO: 4.3 X10E3/UL (ref 1.4–7)
NEUTROPHILS NFR BLD AUTO: 67 %
PLATELET # BLD AUTO: 258 X10E3/UL (ref 150–450)
POTASSIUM SERPL-SCNC: 4.1 MMOL/L (ref 3.5–5.2)
PROT SERPL-MCNC: 6.5 G/DL (ref 6–8.5)
RBC # BLD AUTO: 4.3 X10E6/UL (ref 3.77–5.28)
SODIUM SERPL-SCNC: 136 MMOL/L (ref 134–144)
WBC # BLD AUTO: 6.3 X10E3/UL (ref 3.4–10.8)

## 2019-07-22 LAB
CAMPYLOBACTER STL CULT: NORMAL
E COLI SXT STL QL IA: NEGATIVE
SALM + SHIG STL CULT: NORMAL
SPECIMEN STATUS REPORT, ROLRST: NORMAL

## 2019-08-30 ENCOUNTER — OFFICE VISIT (OUTPATIENT)
Dept: INTERNAL MEDICINE CLINIC | Age: 69
End: 2019-08-30

## 2019-08-30 VITALS
HEIGHT: 63 IN | OXYGEN SATURATION: 99 % | HEART RATE: 66 BPM | TEMPERATURE: 98.4 F | BODY MASS INDEX: 23.21 KG/M2 | SYSTOLIC BLOOD PRESSURE: 134 MMHG | DIASTOLIC BLOOD PRESSURE: 76 MMHG | WEIGHT: 131 LBS

## 2019-08-30 DIAGNOSIS — E78.00 PURE HYPERCHOLESTEROLEMIA: ICD-10-CM

## 2019-08-30 DIAGNOSIS — Z00.00 MEDICARE ANNUAL WELLNESS VISIT, SUBSEQUENT: Primary | ICD-10-CM

## 2019-08-30 DIAGNOSIS — E55.9 VITAMIN D DEFICIENCY: ICD-10-CM

## 2019-08-30 NOTE — PROGRESS NOTES
This is the Subsequent Medicare Annual Wellness Exam, performed 12 months or more after the Initial AWV or the last Subsequent AWV    I have reviewed the patient's medical history in detail and updated the computerized patient record. History     Past Medical History:   Diagnosis Date    Allergic rhinitis     GERD (gastroesophageal reflux disease)     HSV infection     Hypercholesterolemia     Osteopenia     Rosacea     Vitamin D deficiency       Past Surgical History:   Procedure Laterality Date    ENDOSCOPY, COLON, DIAGNOSTIC  2/2007, 2013    ([facility])-f/u 5 yrs. Current Outpatient Medications   Medication Sig Dispense Refill    traZODone (DESYREL) 50 mg tablet Take 1 Tab by mouth nightly. 90 Tab 3    spironolactone (ALDACTONE) 100 mg tablet TAKE 1 TAB BY MOUTH DAILY. 90 Tab 3    finasteride (PROSCAR) 5 mg tablet TAKE 1 TABLET BY MOUTH EVERY DAY 90 Tab 3    simvastatin (ZOCOR) 40 mg tablet TAKE 1 TABLET BY MOUTH NIGHTLY 90 Tab 3    valACYclovir (VALTREX) 500 mg tablet Take  by mouth. Take as needed for herpes simplex      B.infantis-B.ani-B.long-B.bifi (PROBIOTIC 4X) 10-15 mg TbEC Take  by mouth.  OTHER Nighttime elderberry syrup to help with insomnia as needed      azelastine (ASTELIN) 137 mcg (0.1 %) nasal spray 1 [place] by Both Nostrils route two (2) times a day. Use in each nostril as directed 1 Bottle 11    Cholecalciferol, Vitamin D3, (VITAMIN D3) 1,000 unit cap Take  by mouth. 2 tablets by mouth daily      melatonin 5 mg Tab Take 5 mg by mouth nightly. 1-2 tablets by mouth daily      cetirizine (ZYRTEC) 10 mg tablet Take 1 Tab by mouth daily.  30 Tab 0    varicella-zoster recombinant, PF, (SHINGRIX, PF,) 50 mcg/0.5 mL susr injection 0.5mL by IntraMUSCular route once now and then repeat in 2-6 months 0.5 mL 1     Allergies   Allergen Reactions    Merthiolate (Thimerosal) Unknown (comments)     Family History   Problem Relation Age of Onset    Heart Disease Mother 48        CAD  Hypertension Mother     Elevated Lipids Mother     High Cholesterol Mother     Heart Disease Father 48        CAD    Hypertension Father     Elevated Lipids Father     High Cholesterol Father     Liver Disease Paternal Grandfather         hemachromatosis     Social History     Tobacco Use    Smoking status: Never Smoker    Smokeless tobacco: Never Used   Substance Use Topics    Alcohol use: Yes     Alcohol/week: 7.0 standard drinks     Types: 7 Standard drinks or equivalent per week     Patient Active Problem List   Diagnosis Code    HSV (herpes simplex virus) infection B00.9    Menopause Z78.0    Osteopenia M85.80    Rosacea L71.9    Hyperlipidemia E78.5       Depression Risk Factor Screening:     3 most recent PHQ Screens 8/30/2019   Little interest or pleasure in doing things Not at all   Feeling down, depressed, irritable, or hopeless Not at all   Total Score PHQ 2 0     Alcohol Risk Factor Screening:   Drinks 2-3 glasses of wine every day. Functional Ability and Level of Safety:   Hearing Loss  Hearing is good. Activities of Daily Living  The home contains: no safety equipment. Patient does total self care    Fall Risk  Fall Risk Assessment, last 12 mths 8/30/2019   Able to walk? Yes   Fall in past 12 months? No       Abuse Screen  Patient is not abused     ROS:  Negative except: Increased nasal congestion and coughing this year. Increased Zyrtec back from 5 to 10mg every day. GERD controlled with diet. Diarrhea has improved.     Mild stress incontinence     Physical Examination:  Visit Vitals  /76   Pulse 66   Temp 98.4 °F (36.9 °C) (Oral)   Ht 5' 3\" (1.6 m)   Wt 131 lb (59.4 kg)   SpO2 99%   BMI 23.21 kg/m²      General appearance - alert, well appearing, and in no distress and oriented to person, place, and time  Mental status - alert, oriented to person, place, and time  Eyes - pupils equal and reactive, extraocular eye movements intact  Ears - bilateral TM's and external ear canals normal  Nose - normal and patent, no erythema, discharge or polyps  Mouth - mucous membranes moist, pharynx normal without lesions  Neck - supple, no significant adenopathy, carotids upstroke normal bilaterally, no bruits, thyroid exam: thyroid is normal in size without nodules or tenderness  Lymphatics - no palpable lymphadenopathy, no hepatosplenomegaly  Chest - clear to auscultation, no wheezes, rales or rhonchi, symmetric air entry  Heart - normal rate, regular rhythm, normal S1, S2, no murmurs, rubs, clicks or gallops  Abdomen - soft, nontender, nondistended, no masses or organomegaly  bowel sounds normal  Back exam - full range of motion, no tenderness, palpable spasm or pain on motion  Neurological - alert, oriented, normal speech, no focal findings or movement disorder noted  Musculoskeletal - no joint tenderness, deformity or swelling  Extremities - peripheral pulses normal, no pedal edema, no clubbing or cyanosis  Skin - normal coloration and turgor, no rashes, no suspicious skin lesions noted    Cognitive Screening   Evaluation of Cognitive Function:  Has your family/caregiver stated any concerns about your memory: no  Normal    Patient Care Team   Patient Care Team:  Que Bains MD as PCP - General (Internal Medicine)  Daniel Dawson OD as Physician (Optometry)    Assessment/Plan   Education and counseling provided:  Are appropriate based on today's review and evaluation  Advanced directive discussed with patient. Diagnoses and all orders for this visit:    1.  Pure hypercholesterolemia  -     LIPID PANEL  -     HEPATIC FUNCTION PANEL    2. Vitamin D deficiency  -     VITAMIN D, 25 HYDROXY    3. Medicare annual wellness visit, subsequent        Health Maintenance Due   Topic Date Due    Influenza Age 5 to Adult  08/01/2019    MEDICARE YEARLY EXAM  08/30/2020

## 2019-08-30 NOTE — PATIENT INSTRUCTIONS
Medicare Wellness Visit, Female     The best way to live healthy is to have a lifestyle where you eat a well-balanced diet, exercise regularly, limit alcohol use, and quit all forms of tobacco/nicotine, if applicable. Regular preventive services are another way to keep healthy. Preventive services (vaccines, screening tests, monitoring & exams) can help personalize your care plan, which helps you manage your own care. Screening tests can find health problems at the earliest stages, when they are easiest to treat. [facility] follows the current, evidence-based guidelines published by the [facility] Rhett Sada ([facility]) when recommending preventive services for our patients. Because we follow these guidelines, sometimes recommendations change over time as research supports it. (For example, mammograms used to be recommended annually. Even though Medicare will still pay for an annual mammogram, the newer guidelines recommend a mammogram every two years for women of average risk.)  Of course, you and your doctor may decide to screen more often for some diseases, based on your risk and your health status. Preventive services for you include:  - Medicare offers their members a free annual wellness visit, which is time for you and your primary care provider to discuss and plan for your preventive service needs. Take advantage of this benefit every year!  -All adults over the age of 72 should receive the recommended pneumonia vaccines. Current USPSTF guidelines recommend a series of two vaccines for the best pneumonia protection.   -All adults should have a flu vaccine yearly and a tetanus vaccine every 10 years. All adults age 61 and older should receive a shingles vaccine once in their lifetime.    -A bone mass density test is recommended when a woman turns 65 to screen for osteoporosis. This test is only recommended one time, as a screening.  Some providers will use this same test as a disease monitoring tool if you already have osteoporosis. -All adults age 38-68 who are overweight should have a diabetes screening test once every three years.   -Other screening tests and preventive services for persons with diabetes include: an eye exam to screen for diabetic retinopathy, a kidney function test, a foot exam, and stricter control over your cholesterol.   -Cardiovascular screening for adults with routine risk involves an electrocardiogram (ECG) at intervals determined by your doctor.   -Colorectal cancer screenings should be done for adults age 54-65 with no increased risk factors for colorectal cancer. There are a number of acceptable methods of screening for this type of cancer. Each test has its own benefits and drawbacks. Discuss with your doctor what is most appropriate for you during your annual wellness visit. The different tests include: colonoscopy (considered the best screening method), a fecal occult blood test, a fecal DNA test, and sigmoidoscopy. -Breast cancer screenings are recommended every other year for women of normal risk, age 54-69.  -Cervical cancer screenings for women over age 72 are only recommended with certain risk factors.   -All adults born between [facility] should be screened once for Hepatitis C.      Here is a list of your current Health Maintenance items (your personalized list of preventive services) with a due date:      Health Maintenance Due   Topic Date Due    Influenza Age 5 to Adult  08/01/2019    MEDICARE YEARLY EXAM  08/30/2020

## 2019-09-10 ENCOUNTER — HOSPITAL ENCOUNTER (OUTPATIENT)
Dept: LAB | Age: 69
Discharge: HOME | End: 2019-09-10
Payer: MEDICARE

## 2019-09-10 PROCEDURE — 80076 HEPATIC FUNCTION PANEL: CPT

## 2019-09-10 PROCEDURE — 36415 COLL VENOUS BLD VENIPUNCTURE: CPT

## 2019-09-10 PROCEDURE — 82306 VITAMIN D 25 HYDROXY: CPT

## 2019-09-10 PROCEDURE — 80061 LIPID PANEL: CPT

## 2019-09-11 LAB
25(OH)D3+25(OH)D2 SERPL-MCNC: 43.5 NG/ML (ref 30–100)
ALBUMIN SERPL-MCNC: 4.8 G/DL (ref 3.6–4.8)
ALP SERPL-CCNC: 66 IU/L (ref 39–117)
ALT SERPL-CCNC: 22 IU/L (ref 0–32)
AST SERPL-CCNC: 23 IU/L (ref 0–40)
BILIRUB DIRECT SERPL-MCNC: 0.19 MG/DL (ref 0–0.4)
BILIRUB SERPL-MCNC: 0.7 MG/DL (ref 0–1.2)
CHOLEST SERPL-MCNC: 204 MG/DL (ref 100–199)
HDLC SERPL-MCNC: 85 MG/DL
INTERPRETATION, 910389: NORMAL
LDLC SERPL CALC-MCNC: 102 MG/DL (ref 0–99)
PROT SERPL-MCNC: 7 G/DL (ref 6–8.5)
TRIGL SERPL-MCNC: 83 MG/DL (ref 0–149)
VLDLC SERPL CALC-MCNC: 17 MG/DL (ref 5–40)

## 2019-09-16 RX ORDER — SIMVASTATIN 40 MG/1
TABLET, FILM COATED ORAL
Qty: 90 TAB | Refills: 3 | Status: SHIPPED | OUTPATIENT
Start: 2019-09-16 | End: 2020-10-20 | Stop reason: SDUPTHER

## 2019-12-11 RX ORDER — FINASTERIDE 5 MG/1
TABLET, FILM COATED ORAL
Qty: 90 TAB | Refills: 3 | Status: SHIPPED | OUTPATIENT
Start: 2019-12-11 | End: 2020-11-19

## 2020-01-02 ENCOUNTER — HOSPITAL ENCOUNTER (OUTPATIENT)
Dept: MAMMOGRAPHY | Age: 70
Discharge: HOME | End: 2020-01-02
Attending: INTERNAL MEDICINE
Payer: MEDICARE

## 2020-01-02 DIAGNOSIS — Z12.31 VISIT FOR SCREENING MAMMOGRAM: ICD-10-CM

## 2020-01-02 PROCEDURE — 77063 BREAST TOMOSYNTHESIS BI: CPT

## 2020-02-14 RX ORDER — SPIRONOLACTONE 100 MG/1
100 TABLET, FILM COATED ORAL DAILY
Qty: 90 TAB | Refills: 3 | Status: SHIPPED | OUTPATIENT
Start: 2020-02-14 | End: 2021-02-24

## 2020-02-26 NOTE — PROGRESS NOTES
Subjective:   Raffi Dooley is a 77 y.o. female who is seen for follow up of hyperlipidemia. Cardiovascular risk analysis - 77 y.o. female LDL goal is under 130. Compliance with treatment thus far has been good. ROS: taking medications as instructed, no medication side effects noted, no TIA's, no chest pain on exertion, no dyspnea on exertion, no swelling of ankles, no orthostatic dizziness or lightheadedness, no palpitations  . New concerns:   Sinus infection entire month of January. End of January went to [facility] and given abx and Mucinex. Today a little \"drippy\" but doing better. .     Current Outpatient Medications   Medication Sig Dispense Refill    spironolactone (ALDACTONE) 100 mg tablet TAKE 1 TAB BY MOUTH DAILY. 90 Tab 3    finasteride (PROSCAR) 5 mg tablet TAKE 1 TABLET BY MOUTH EVERY DAY 90 Tab 3    simvastatin (ZOCOR) 40 mg tablet TAKE 1 TABLET BY MOUTH NIGHTLY 90 Tab 3    traZODone (DESYREL) 50 mg tablet Take 1 Tab by mouth nightly. 90 Tab 3    valACYclovir (VALTREX) 500 mg tablet Take  by mouth. Take as needed for herpes simplex      azelastine (ASTELIN) 137 mcg (0.1 %) nasal spray 1 [place] by Both Nostrils route two (2) times a day. Use in each nostril as directed 1 Bottle 11    Cholecalciferol, Vitamin D3, (VITAMIN D3) 1,000 unit cap Take  by mouth. 2 tablets by mouth daily      melatonin 5 mg Tab Take 5 mg by mouth nightly. 1-2 tablets by mouth daily      cetirizine (ZYRTEC) 10 mg tablet Take 1 Tab by mouth daily. 30 Tab 0      Lab Results   Component Value Date/Time    Cholesterol, total 204 (H) 09/10/2019 09:11 AM    HDL Cholesterol 85 09/10/2019 09:11 AM    LDL, calculated 102 (H) 09/10/2019 09:11 AM    LDL-C, External 116 04/21/2015    Triglyceride 83 09/10/2019 09:11 AM     Lab Results   Component Value Date/Time    ALT (SGPT) 22 09/10/2019 09:11 AM    AST (SGOT) 23 09/10/2019 09:11 AM    Alk.  phosphatase 66 09/10/2019 09:11 AM    Bilirubin, direct 0.19 09/10/2019 09:11 AM Bilirubin, total 0.7 09/10/2019 09:11 AM    Albumin 4.8 09/10/2019 09:11 AM    Protein, total 7.0 09/10/2019 09:11 AM    PLATELET 430 00/95/3687 02:23 PM        Objective:     Visit Vitals  /65   Pulse 83   Temp 98.3 °F (36.8 °C) (Oral)   Ht 5' 3\" (1.6 m)   Wt 133 lb (60.3 kg)   SpO2 97%   BMI 23.56 kg/m²      Appearance: alert, well appearing, and in no distress and oriented to person, place, and time. CVS exam .  NECK: supple, no lad, no bruit, no tm  LUNGS: cta bilat  CV rrr, no m/g/r  ABD: soft, nt, nd, nabs  EXT: no c/c/e    Assessment/Plan:   Hyperlipidemia well controlled. current treatment plan is effective, no change in therapy. Check labs. Orders Placed This Encounter    LIPID PANEL    HEPATIC FUNCTION PANEL    DISCONTD: doxycycline (VIBRAMYCIN) 100 mg capsule       .

## 2020-02-27 ENCOUNTER — OFFICE VISIT (OUTPATIENT)
Dept: INTERNAL MEDICINE CLINIC | Age: 70
End: 2020-02-27

## 2020-02-27 VITALS
OXYGEN SATURATION: 97 % | BODY MASS INDEX: 23.57 KG/M2 | TEMPERATURE: 98.3 F | DIASTOLIC BLOOD PRESSURE: 65 MMHG | SYSTOLIC BLOOD PRESSURE: 117 MMHG | HEIGHT: 63 IN | HEART RATE: 83 BPM | WEIGHT: 133 LBS

## 2020-02-27 DIAGNOSIS — E78.00 PURE HYPERCHOLESTEROLEMIA: Primary | ICD-10-CM

## 2020-02-27 RX ORDER — DOXYCYCLINE 100 MG/1
CAPSULE ORAL
COMMUNITY
Start: 2020-02-07 | End: 2020-02-27 | Stop reason: ALTCHOICE

## 2020-02-27 NOTE — PROGRESS NOTES
Chief Complaint   Patient presents with    Follow-up     Visit Vitals  /65   Pulse 83   Temp 98.3 °F (36.8 °C) (Oral)   Ht 5' 3\" (1.6 m)   Wt 133 lb (60.3 kg)   SpO2 97%   BMI 23.56 kg/m²     1. Have you been to the ER, urgent care clinic since your last visit? Hospitalized since your last visit? Yes     2. Have you seen or consulted any other health care providers outside of the [address] [place] [address] since your last visit? Include any pap smears or colon screening.  no

## 2020-03-02 ENCOUNTER — HOSPITAL ENCOUNTER (OUTPATIENT)
Dept: LAB | Age: 70
Discharge: HOME | End: 2020-03-02
Payer: MEDICARE

## 2020-03-02 PROCEDURE — 80076 HEPATIC FUNCTION PANEL: CPT

## 2020-03-02 PROCEDURE — 80061 LIPID PANEL: CPT

## 2020-03-02 PROCEDURE — 36415 COLL VENOUS BLD VENIPUNCTURE: CPT

## 2020-03-03 LAB
ALBUMIN SERPL-MCNC: 4.6 G/DL (ref 3.8–4.8)
ALP SERPL-CCNC: 61 IU/L (ref 39–117)
ALT SERPL-CCNC: 37 IU/L (ref 0–32)
AST SERPL-CCNC: 25 IU/L (ref 0–40)
BILIRUB DIRECT SERPL-MCNC: 0.18 MG/DL (ref 0–0.4)
BILIRUB SERPL-MCNC: 0.6 MG/DL (ref 0–1.2)
CHOLEST SERPL-MCNC: 172 MG/DL (ref 100–199)
HDLC SERPL-MCNC: 83 MG/DL
INTERPRETATION, 910389: NORMAL
LDLC SERPL CALC-MCNC: 78 MG/DL (ref 0–99)
PROT SERPL-MCNC: 6.7 G/DL (ref 6–8.5)
TRIGL SERPL-MCNC: 54 MG/DL (ref 0–149)
VLDLC SERPL CALC-MCNC: 11 MG/DL (ref 5–40)

## 2020-05-27 RX ORDER — TRAZODONE HYDROCHLORIDE 50 MG/1
TABLET ORAL
Qty: 90 TAB | Refills: 3 | Status: SHIPPED | OUTPATIENT
Start: 2020-05-27 | End: 2021-03-19 | Stop reason: ALTCHOICE

## 2020-09-04 ENCOUNTER — OFFICE VISIT (OUTPATIENT)
Dept: INTERNAL MEDICINE CLINIC | Age: 70
End: 2020-09-04
Payer: MEDICARE

## 2020-09-04 VITALS
OXYGEN SATURATION: 99 % | TEMPERATURE: 95.1 F | DIASTOLIC BLOOD PRESSURE: 73 MMHG | HEART RATE: 66 BPM | RESPIRATION RATE: 16 BRPM | SYSTOLIC BLOOD PRESSURE: 134 MMHG

## 2020-09-04 DIAGNOSIS — Z00.00 MEDICARE ANNUAL WELLNESS VISIT, SUBSEQUENT: Primary | ICD-10-CM

## 2020-09-04 DIAGNOSIS — E78.00 PURE HYPERCHOLESTEROLEMIA: ICD-10-CM

## 2020-09-04 DIAGNOSIS — E55.9 VITAMIN D DEFICIENCY: ICD-10-CM

## 2020-09-04 DIAGNOSIS — Z71.89 ADVANCE DIRECTIVE DISCUSSED WITH PATIENT: ICD-10-CM

## 2020-09-04 PROCEDURE — G8427 DOCREV CUR MEDS BY ELIG CLIN: HCPCS | Performed by: INTERNAL MEDICINE

## 2020-09-04 PROCEDURE — 3288F FALL RISK ASSESSMENT DOCD: CPT | Performed by: INTERNAL MEDICINE

## 2020-09-04 PROCEDURE — G8510 SCR DEP NEG, NO PLAN REQD: HCPCS | Performed by: INTERNAL MEDICINE

## 2020-09-04 PROCEDURE — G0439 PPPS, SUBSEQ VISIT: HCPCS | Performed by: INTERNAL MEDICINE

## 2020-09-04 PROCEDURE — 1100F PTFALLS ASSESS-DOCD GE2>/YR: CPT | Performed by: INTERNAL MEDICINE

## 2020-09-04 PROCEDURE — G8420 CALC BMI NORM PARAMETERS: HCPCS | Performed by: INTERNAL MEDICINE

## 2020-09-04 PROCEDURE — G8536 NO DOC ELDER MAL SCRN: HCPCS | Performed by: INTERNAL MEDICINE

## 2020-09-04 PROCEDURE — G0444 DEPRESSION SCREEN ANNUAL: HCPCS | Performed by: INTERNAL MEDICINE

## 2020-09-04 PROCEDURE — G8399 PT W/DXA RESULTS DOCUMENT: HCPCS | Performed by: INTERNAL MEDICINE

## 2020-09-04 PROCEDURE — 3017F COLORECTAL CA SCREEN DOC REV: CPT | Performed by: INTERNAL MEDICINE

## 2020-09-04 PROCEDURE — G9899 SCRN MAM PERF RSLTS DOC: HCPCS | Performed by: INTERNAL MEDICINE

## 2020-09-04 NOTE — PATIENT INSTRUCTIONS
Medicare Wellness Visit, Female The best way to live healthy is to have a lifestyle where you eat a well-balanced diet, exercise regularly, limit alcohol use, and quit all forms of tobacco/nicotine, if applicable. Regular preventive services are another way to keep healthy. Preventive services (vaccines, screening tests, monitoring & exams) can help personalize your care plan, which helps you manage your own care. Screening tests can find health problems at the earliest stages, when they are easiest to treat. [facility] follows the current, evidence-based guidelines published by the [facility] Rhett Tomlin ([facility]) when recommending preventive services for our patients. Because we follow these guidelines, sometimes recommendations change over time as research supports it. (For example, mammograms used to be recommended annually. Even though Medicare will still pay for an annual mammogram, the newer guidelines recommend a mammogram every two years for women of average risk). Of course, you and your doctor may decide to screen more often for some diseases, based on your risk and your co-morbidities (chronic disease you are already diagnosed with). Preventive services for you include: - Medicare offers their members a free annual wellness visit, which is time for you and your primary care provider to discuss and plan for your preventive service needs. Take advantage of this benefit every year! 
-All adults over the age of 72 should receive the recommended pneumonia vaccines. Current USPSTF guidelines recommend a series of two vaccines for the best pneumonia protection.  
-All adults should have a flu vaccine yearly and a tetanus vaccine every 10 years.  
-All adults age 48 and older should receive the shingles vaccines (series of two vaccines). -All adults age 38-68 who are overweight should have a diabetes screening test once every three years. -All adults born between [birth year] and 1964 should be screened once for Hepatitis C. 
-Other screening tests and preventive services for persons with diabetes include: an eye exam to screen for diabetic retinopathy, a kidney function test, a foot exam, and stricter control over your cholesterol.  
-Cardiovascular screening for adults with routine risk involves an electrocardiogram (ECG) at intervals determined by your doctor.  
-Colorectal cancer screenings should be done for adults age 54-65 with no increased risk factors for colorectal cancer. There are a number of acceptable methods of screening for this type of cancer. Each test has its own benefits and drawbacks. Discuss with your doctor what is most appropriate for you during your annual wellness visit. The different tests include: colonoscopy (considered the best screening method), a fecal occult blood test, a fecal DNA test, and sigmoidoscopy. 
 
-A bone mass density test is recommended when a woman turns 65 to screen for osteoporosis. This test is only recommended one time, as a screening. Some providers will use this same test as a disease monitoring tool if you already have osteoporosis. -Breast cancer screenings are recommended every other year for women of normal risk, age 54-69. 
-Cervical cancer screenings for women over age 72 are only recommended with certain risk factors. Here is a list of your current Health Maintenance items (your personalized list of preventive services) with a due date: 
 
 
Health Maintenance Due Topic Date Due  GLAUCOMA SCREENING Q2Y  Up to date  Flu Vaccine (1) UP to date  Medicare Yearly Exam  08/04/2021

## 2020-09-04 NOTE — PROGRESS NOTES
This is the Subsequent Medicare Annual Wellness Exam, performed 12 months or more after the Initial AWV or the last Subsequent AWV    I have reviewed the patient's medical history in detail and updated the computerized patient record. Had right jaw pain. Saw dentist, xrays better. Placed on Valium 5mg qhs but could not tolerate so only took 2.5mg qhs. No just taking NSAID which helps. Painful when first wakes up and with yawning. History     Patient Active Problem List   Diagnosis Code    HSV (herpes simplex virus) infection B00.9    Menopause Z78.0    Osteopenia M85.80    Rosacea L71.9    Hyperlipidemia E78.5     Past Medical History:   Diagnosis Date    Allergic rhinitis     GERD (gastroesophageal reflux disease)     HSV infection     Hypercholesterolemia     Osteopenia     Rosacea     Vitamin D deficiency       Past Surgical History:   Procedure Laterality Date    ENDOSCOPY, COLON, DIAGNOSTIC  2/2007, 2013    ([facility])-f/u 5 yrs. Current Outpatient Medications   Medication Sig Dispense Refill    traZODone (DESYREL) 50 mg tablet TAKE 1 TABLET BY MOUTH EVERY DAY AT NIGHT 90 Tab 3    spironolactone (ALDACTONE) 100 mg tablet TAKE 1 TAB BY MOUTH DAILY. 90 Tab 3    finasteride (PROSCAR) 5 mg tablet TAKE 1 TABLET BY MOUTH EVERY DAY 90 Tab 3    simvastatin (ZOCOR) 40 mg tablet TAKE 1 TABLET BY MOUTH NIGHTLY 90 Tab 3    valACYclovir (VALTREX) 500 mg tablet Take  by mouth. Take as needed for herpes simplex      Cholecalciferol, Vitamin D3, (VITAMIN D3) 1,000 unit cap Take  by mouth. 2 tablets by mouth daily      melatonin 5 mg Tab Take 5 mg by mouth nightly. 1-2 tablets by mouth daily      cetirizine (ZYRTEC) 10 mg tablet Take 1 Tab by mouth daily.  30 Tab 0     Allergies   Allergen Reactions    Merthiolate (Thimerosal) Unknown (comments)       Family History   Problem Relation Age of Onset    Heart Disease Mother 48        CAD    Hypertension Mother     Elevated Lipids Mother    Aundra Black High Cholesterol Mother     Heart Disease Father 48        CAD    Hypertension Father     Elevated Lipids Father     High Cholesterol Father     Liver Disease Paternal Grandfather         hemachromatosis     Social History     Tobacco Use    Smoking status: Never Smoker    Smokeless tobacco: Never Used   Substance Use Topics    Alcohol use: Yes     Alcohol/week: 7.0 standard drinks     Types: 7 Standard drinks or equivalent per week       Depression Risk Factor Screening:     3 most recent PHQ Screens 9/4/2020   Little interest or pleasure in doing things Not at all   Feeling down, depressed, irritable, or hopeless Not at all   Total Score PHQ 2 0       Alcohol Risk Factor Screening:   Do you average 1 drink per night or more than 7 drinks a week:  Yes    On any one occasion in the past three months have you have had more than 3 drinks containing alcohol:  No      Functional Ability and Level of Safety:   Hearing: Hearing is good. Activities of Daily Living: The home contains: no safety equipment. Patient does total self care     Ambulation: with no difficulty and but has tripped and fallen over brick sidewalks. Fall Risk:  Fall Risk Assessment, last 12 mths 9/4/2020   Able to walk? Yes   Fall in past 12 months? Yes   Fall with injury? No   Number of falls in past 12 months 3   Fall Risk Score 3     Abuse Screen:  Patient is not abused      Visit Vitals  /73   Pulse 66   Temp (!) 95.1 °F (35.1 °C) (Temporal)   Resp 16   SpO2 99%     NECK: supple, no lad, no bruit, no tm  LUNGS: cta bilat  CV rrr, no m/g/r  ABD: soft, nt, nd, nabs  EXT: no c/c/e  Right TMJ nontender but grinds with opening and closing.       Cognitive Screening   Has your family/caregiver stated any concerns about your memory: no     Cognitive Screening: Normal -      Patient Care Team   Patient Care Team:  Sagrario Valadez MD as PCP - General (Internal Medicine)  Sagrario Valadez MD as PCP - [facility] Provider  Nohemy Pinon, OD as Physician (Optometry)    Assessment/Plan   Education and counseling provided:  Are appropriate based on today's review and evaluation   Advanced diretive discussed with patient. Diagnoses and all orders for this visit:    1. Medicare annual wellness visit, subsequent    2. Vitamin D deficiency  -     VITAMIN D, 25 HYDROXY; Future    3. Pure hypercholesterolemia  -     METABOLIC PANEL, COMPREHENSIVE; Future  -     LIPID PANEL; Future    4.  Advance directive discussed with patient        Health Maintenance Due   Topic Date Due    GLAUCOMA SCREENING Q2Y  Up to date    Flu Vaccine (1) UP to date    Medicare Yearly Exam  08/04/2021

## 2020-09-04 NOTE — PROGRESS NOTES
Patient has been informed of any other discussion outside the parameters of the physical could result in other charges including a co pay, patient verbalized understanding. 1. Have you been to the ER, urgent care clinic since your last visit? Hospitalized since your last visit? No    2. Have you seen or consulted any other health care providers outside of the [address] [place] [address] since your last visit? Include any pap smears or colon screening.  No     Chief Complaint   Patient presents with   [address] [place] [address] Annual Wellness Visit

## 2020-09-29 DIAGNOSIS — E78.00 PURE HYPERCHOLESTEROLEMIA: ICD-10-CM

## 2020-09-29 DIAGNOSIS — E55.9 VITAMIN D DEFICIENCY: ICD-10-CM

## 2020-09-29 LAB
25(OH)D3 SERPL-MCNC: 45.7 NG/ML (ref 30–100)
ALBUMIN SERPL-MCNC: 4.5 G/DL (ref 3.5–5)
ALBUMIN/GLOB SERPL: 1.6 {RATIO} (ref 1.1–2.2)
ALP SERPL-CCNC: 73 U/L (ref 45–117)
ALT SERPL-CCNC: 40 U/L (ref 12–78)
ANION GAP SERPL CALC-SCNC: 6 MMOL/L (ref 5–15)
AST SERPL-CCNC: 25 U/L (ref 15–37)
BILIRUB SERPL-MCNC: 0.9 MG/DL (ref 0.2–1)
BUN SERPL-MCNC: 11 MG/DL (ref 6–20)
BUN/CREAT SERPL: 18 (ref 12–20)
CALCIUM SERPL-MCNC: 9.5 MG/DL (ref 8.5–10.1)
CHLORIDE SERPL-SCNC: 95 MMOL/L (ref 97–108)
CHOLEST SERPL-MCNC: 200 MG/DL
CO2 SERPL-SCNC: 26 MMOL/L (ref 21–32)
CREAT SERPL-MCNC: 0.62 MG/DL (ref 0.55–1.02)
GLOBULIN SER CALC-MCNC: 2.9 G/DL (ref 2–4)
GLUCOSE SERPL-MCNC: 107 MG/DL (ref 65–100)
HDLC SERPL-MCNC: 109 MG/DL
HDLC SERPL: 1.8 {RATIO} (ref 0–5)
LDLC SERPL CALC-MCNC: 77.8 MG/DL (ref 0–100)
LIPID PROFILE,FLP: ABNORMAL
POTASSIUM SERPL-SCNC: 4.6 MMOL/L (ref 3.5–5.1)
PROT SERPL-MCNC: 7.4 G/DL (ref 6.4–8.2)
SODIUM SERPL-SCNC: 127 MMOL/L (ref 136–145)
TRIGL SERPL-MCNC: 66 MG/DL (ref ?–150)
VLDLC SERPL CALC-MCNC: 13.2 MG/DL

## 2020-09-30 ENCOUNTER — TELEPHONE (OUTPATIENT)
Dept: INTERNAL MEDICINE CLINIC | Age: 70
End: 2020-09-30

## 2020-09-30 DIAGNOSIS — E87.1 HYPONATREMIA: Primary | ICD-10-CM

## 2020-10-05 DIAGNOSIS — E87.1 HYPONATREMIA: ICD-10-CM

## 2020-10-05 DIAGNOSIS — E78.00 PURE HYPERCHOLESTEROLEMIA: ICD-10-CM

## 2020-10-05 LAB
ALBUMIN SERPL-MCNC: 4.4 G/DL (ref 3.5–5)
ALBUMIN/GLOB SERPL: 1.6 {RATIO} (ref 1.1–2.2)
ALP SERPL-CCNC: 71 U/L (ref 45–117)
ALT SERPL-CCNC: 37 U/L (ref 12–78)
ANION GAP SERPL CALC-SCNC: 5 MMOL/L (ref 5–15)
AST SERPL-CCNC: 28 U/L (ref 15–37)
BILIRUB DIRECT SERPL-MCNC: 0.2 MG/DL (ref 0–0.2)
BILIRUB SERPL-MCNC: 0.8 MG/DL (ref 0.2–1)
BUN SERPL-MCNC: 12 MG/DL (ref 6–20)
BUN/CREAT SERPL: 19 (ref 12–20)
CALCIUM SERPL-MCNC: 9.4 MG/DL (ref 8.5–10.1)
CHLORIDE SERPL-SCNC: 98 MMOL/L (ref 97–108)
CHOLEST SERPL-MCNC: 190 MG/DL
CO2 SERPL-SCNC: 27 MMOL/L (ref 21–32)
CREAT SERPL-MCNC: 0.64 MG/DL (ref 0.55–1.02)
GLOBULIN SER CALC-MCNC: 2.7 G/DL (ref 2–4)
GLUCOSE SERPL-MCNC: 96 MG/DL (ref 65–100)
HDLC SERPL-MCNC: 102 MG/DL
HDLC SERPL: 1.9 {RATIO} (ref 0–5)
LDLC SERPL CALC-MCNC: 76.4 MG/DL (ref 0–100)
LIPID PROFILE,FLP: NORMAL
POTASSIUM SERPL-SCNC: 4 MMOL/L (ref 3.5–5.1)
PROT SERPL-MCNC: 7.1 G/DL (ref 6.4–8.2)
SODIUM SERPL-SCNC: 130 MMOL/L (ref 136–145)
TRIGL SERPL-MCNC: 58 MG/DL (ref ?–150)
VLDLC SERPL CALC-MCNC: 11.6 MG/DL

## 2020-10-20 RX ORDER — SIMVASTATIN 40 MG/1
TABLET, FILM COATED ORAL
Qty: 90 TAB | Refills: 3 | Status: SHIPPED | OUTPATIENT
Start: 2020-10-20 | End: 2021-10-24

## 2020-11-16 ENCOUNTER — TELEPHONE (OUTPATIENT)
Dept: INTERNAL MEDICINE CLINIC | Age: 70
End: 2020-11-16

## 2020-11-17 NOTE — TELEPHONE ENCOUNTER
----- Message from Indigo Carter sent at 11/16/2020  3:50 PM EST -----  Regarding: FW: Visit Follow-Up Question  Contact: [phone]    ----- Message -----  From: Tramaine Coburn  Sent: 11/16/2020   3:30 PM EST  To: Ernie Watters  Subject: Visit Follow-Up Question                         Appointment Request From: Tramaine Coburn   Preferred Date Range: 2/4/2021 - 4/4/2021   Reason for visit: Request an Appointment     Comments:  I had a physical on 9/4/20. When I left I could not make a follow up appointment, which I usually do for 6 months, because they did not have a calendar to that date. I sent a request for an appt. but it has not been read. Any suggestions? Should I call?

## 2020-11-17 NOTE — TELEPHONE ENCOUNTER
I scheduled her an appointment already in October for [email or web address]. I will remind her.

## 2020-12-10 ENCOUNTER — TELEPHONE (OUTPATIENT)
Dept: INTERNAL MEDICINE CLINIC | Age: 70
End: 2020-12-10

## 2020-12-10 DIAGNOSIS — Z00.00 WELLNESS EXAMINATION: ICD-10-CM

## 2020-12-10 RX ORDER — VALACYCLOVIR HYDROCHLORIDE 500 MG/1
2000 TABLET, FILM COATED ORAL
Qty: 32 TAB | Refills: 1 | Status: SHIPPED | OUTPATIENT
Start: 2020-12-10 | End: 2021-09-03 | Stop reason: SDUPTHER

## 2020-12-11 NOTE — TELEPHONE ENCOUNTER
----- Message from Dima Vinson sent at 12/10/2020  1:33 PM EST -----  Regarding: FW: Update Medical Information  Contact: [phone]    ----- Message -----  From: Maldonado Sargent  Sent: 12/10/2020   1:06 PM EST  To: Dottie [facility]y Nurse Pool  Subject: Update Medical Information                       I noticed that [facility] says that my Valtrex 500mg prescription could not be refilled? Can it be refilled? Thank you!

## 2021-01-22 ENCOUNTER — TRANSCRIBE ORDER (OUTPATIENT)
Dept: SCHEDULING | Age: 71
End: 2021-01-22

## 2021-01-22 DIAGNOSIS — Z12.31 SCREENING MAMMOGRAM FOR HIGH-RISK PATIENT: Primary | ICD-10-CM

## 2021-01-29 ENCOUNTER — IMMUNIZATION (OUTPATIENT)
Dept: FAMILY MEDICINE CLINIC | Age: 71
End: 2021-01-29
Payer: MEDICARE

## 2021-01-29 DIAGNOSIS — Z23 ENCOUNTER FOR IMMUNIZATION: Primary | ICD-10-CM

## 2021-01-29 PROCEDURE — 0011A PR IMM ADMN SARSCOV2 100 MCG/0.5 ML 1ST DOSE: CPT | Performed by: FAMILY MEDICINE

## 2021-01-29 PROCEDURE — 91301 COVID-19, MRNA, LNP-S, PF, 100MCG/0.5ML DOSE(MODERNA): CPT | Performed by: FAMILY MEDICINE

## 2021-03-01 ENCOUNTER — IMMUNIZATION (OUTPATIENT)
Dept: FAMILY MEDICINE CLINIC | Age: 71
End: 2021-03-01
Payer: MEDICARE

## 2021-03-01 DIAGNOSIS — Z23 ENCOUNTER FOR IMMUNIZATION: Primary | ICD-10-CM

## 2021-03-01 PROCEDURE — 0012A PR IMM ADMN SARSCOV2 100 MCG/0.5 ML 2ND DOSE: CPT | Performed by: FAMILY MEDICINE

## 2021-03-01 PROCEDURE — 91301 COVID-19, MRNA, LNP-S, PF, 100MCG/0.5ML DOSE(MODERNA): CPT | Performed by: FAMILY MEDICINE

## 2021-03-18 NOTE — PROGRESS NOTES
Subjective:   Kiya Rivera is a 72 y.o. female who is seen for follow up of hyperlipidemia. Cardiovascular risk analysis - 72 y.o. female LDL goal is under 130. Compliance with treatment thus far has been good. ROS: taking medications as instructed, no medication side effects noted, no TIA's, no chest pain on exertion, no dyspnea on exertion, no swelling of ankles, no orthostatic dizziness or lightheadedness, no palpitations. New concerns:   Some walking - less recently secondary to weather. Off the Trazodone secondary to low sodium. Sleeping ok but if sleeps on side with jaw on pillow has jaw pain. Continues on melatonin. .     Current Outpatient Medications   Medication Sig Dispense Refill    spironolactone (ALDACTONE) 100 mg tablet TAKE ONE TABLET BY MOUTH DAILY 90 Tab 3    valACYclovir (Valtrex) 500 mg tablet Take 4 Tabs by mouth every twelve (12) hours as needed (outbreak). Take as needed for herpes simplex for 2 doses. 32 Tab 1    finasteride (PROSCAR) 5 mg tablet TAKE ONE TABLET BY MOUTH DAILY 90 Tab 3    simvastatin (ZOCOR) 40 mg tablet TAKE 1 TABLET BY MOUTH NIGHTLY 90 Tab 3    Cholecalciferol, Vitamin D3, (VITAMIN D3) 1,000 unit cap Take  by mouth. 2 tablets by mouth daily      melatonin 5 mg Tab Take 5 mg by mouth nightly. 1-2 tablets by mouth daily      cetirizine (ZYRTEC) 10 mg tablet Take 1 Tab by mouth daily. 30 Tab 0      Lab Results   Component Value Date/Time    Glucose 96 10/05/2020 09:48 AM    LDL, calculated 76.4 10/05/2020 09:48 AM    Creatinine 0.64 10/05/2020 09:48 AM      Lab Results   Component Value Date/Time    Cholesterol, total 190 10/05/2020 09:48 AM    HDL Cholesterol 102 10/05/2020 09:48 AM    LDL, calculated 76.4 10/05/2020 09:48 AM    LDL-C, External 116 04/21/2015    Triglyceride 58 10/05/2020 09:48 AM    CHOL/HDL Ratio 1.9 10/05/2020 09:48 AM     Lab Results   Component Value Date/Time    ALT (SGPT) 37 10/05/2020 09:48 AM    Alk.  phosphatase 71 10/05/2020 09:48 AM    Bilirubin, direct 0.2 10/05/2020 09:48 AM    Bilirubin, total 0.8 10/05/2020 09:48 AM    Albumin 4.4 10/05/2020 09:48 AM    Protein, total 7.1 10/05/2020 09:48 AM    PLATELET 338 42/33/4779 02:23 PM     Lab Results   Component Value Date/Time    GFR est non-AA >60 10/05/2020 09:48 AM    GFR est AA >60 10/05/2020 09:48 AM    Creatinine 0.64 10/05/2020 09:48 AM    BUN 12 10/05/2020 09:48 AM    Sodium 130 (L) 10/05/2020 09:48 AM    Potassium 4.0 10/05/2020 09:48 AM    Chloride 98 10/05/2020 09:48 AM    CO2 27 10/05/2020 09:48 AM        Objective:     Visit Vitals  /75   Pulse 91   Temp 98.5 °F (36.9 °C) (Temporal)   Resp 20   Ht 5' 3\" (1.6 m)   Wt 133 lb (60.3 kg)   SpO2 96%   BMI 23.56 kg/m²      Appearance: alert, well appearing, and in no distress and oriented to person, place, and time. NECK: supple, no lad, no bruit, no tm  LUNGS: cta bilat  CV rrr, no m/g/r  ABD: soft, nt, nd, nabs  EXT: no c/c/e    Assessment/Plan:   Hyperlipidemia well controlled. current treatment plan is effective, no change in therapy. Orders Placed This Encounter    METABOLIC PANEL, COMPREHENSIVE    LIPID PANEL     Follow-up and Dispositions    · Return in about 6 months (around 9/19/2021).      Miguel Marlow

## 2021-03-19 ENCOUNTER — OFFICE VISIT (OUTPATIENT)
Dept: INTERNAL MEDICINE CLINIC | Age: 71
End: 2021-03-19
Payer: MEDICARE

## 2021-03-19 VITALS
SYSTOLIC BLOOD PRESSURE: 132 MMHG | HEART RATE: 91 BPM | WEIGHT: 133 LBS | RESPIRATION RATE: 20 BRPM | OXYGEN SATURATION: 96 % | TEMPERATURE: 98.5 F | HEIGHT: 63 IN | BODY MASS INDEX: 23.57 KG/M2 | DIASTOLIC BLOOD PRESSURE: 75 MMHG

## 2021-03-19 DIAGNOSIS — E78.00 PURE HYPERCHOLESTEROLEMIA: Primary | ICD-10-CM

## 2021-03-19 PROCEDURE — G8536 NO DOC ELDER MAL SCRN: HCPCS | Performed by: INTERNAL MEDICINE

## 2021-03-19 PROCEDURE — G8432 DEP SCR NOT DOC, RNG: HCPCS | Performed by: INTERNAL MEDICINE

## 2021-03-19 PROCEDURE — G9899 SCRN MAM PERF RSLTS DOC: HCPCS | Performed by: INTERNAL MEDICINE

## 2021-03-19 PROCEDURE — 3288F FALL RISK ASSESSMENT DOCD: CPT | Performed by: INTERNAL MEDICINE

## 2021-03-19 PROCEDURE — 3017F COLORECTAL CA SCREEN DOC REV: CPT | Performed by: INTERNAL MEDICINE

## 2021-03-19 PROCEDURE — G8399 PT W/DXA RESULTS DOCUMENT: HCPCS | Performed by: INTERNAL MEDICINE

## 2021-03-19 PROCEDURE — 1090F PRES/ABSN URINE INCON ASSESS: CPT | Performed by: INTERNAL MEDICINE

## 2021-03-19 PROCEDURE — G0463 HOSPITAL OUTPT CLINIC VISIT: HCPCS | Performed by: INTERNAL MEDICINE

## 2021-03-19 PROCEDURE — G8427 DOCREV CUR MEDS BY ELIG CLIN: HCPCS | Performed by: INTERNAL MEDICINE

## 2021-03-19 PROCEDURE — G8420 CALC BMI NORM PARAMETERS: HCPCS | Performed by: INTERNAL MEDICINE

## 2021-03-19 PROCEDURE — 99213 OFFICE O/P EST LOW 20 MIN: CPT | Performed by: INTERNAL MEDICINE

## 2021-03-19 PROCEDURE — 1100F PTFALLS ASSESS-DOCD GE2>/YR: CPT | Performed by: INTERNAL MEDICINE

## 2021-04-07 ENCOUNTER — TELEPHONE (OUTPATIENT)
Dept: INTERNAL MEDICINE CLINIC | Age: 71
End: 2021-04-07

## 2021-04-07 DIAGNOSIS — E78.00 PURE HYPERCHOLESTEROLEMIA: Primary | ICD-10-CM

## 2021-04-07 NOTE — TELEPHONE ENCOUNTER
----- Message from Mehrdad Kang sent at 4/7/2021  2:59 PM EDT -----  Regarding: FW: Non-Urgent Medical Question  Contact: [phone]    ----- Message -----  From: Camron Cao  Sent: 4/7/2021   2:56 PM EDT  To: Maik Watters  Subject: Non-Urgent Medical Question                      I have a lab test prescription for [facility]. Can I use it for [facility]?  Montserrat Sosa been the the [facility] location next door to you 2 times and it was closed. I rather go to [facility] where   I can make an appointment. So you know whether it will cost more to [facility]?    Thank you.

## 2021-04-12 ENCOUNTER — HOSPITAL ENCOUNTER (OUTPATIENT)
Dept: LAB | Age: 71
Discharge: HOME | End: 2021-04-12
Payer: MEDICARE

## 2021-04-12 PROCEDURE — 36415 COLL VENOUS BLD VENIPUNCTURE: CPT

## 2021-04-12 PROCEDURE — 80053 COMPREHEN METABOLIC PANEL: CPT

## 2021-04-12 PROCEDURE — 80061 LIPID PANEL: CPT

## 2021-04-13 LAB
ALBUMIN SERPL-MCNC: 4.4 G/DL (ref 3.8–4.8)
ALBUMIN/GLOB SERPL: 1.9 {RATIO} (ref 1.2–2.2)
ALP SERPL-CCNC: 74 IU/L (ref 39–117)
ALT SERPL-CCNC: 22 IU/L (ref 0–32)
AST SERPL-CCNC: 19 IU/L (ref 0–40)
BILIRUB SERPL-MCNC: 0.4 MG/DL (ref 0–1.2)
BUN SERPL-MCNC: 9 MG/DL (ref 8–27)
BUN/CREAT SERPL: 15 (ref 12–28)
CALCIUM SERPL-MCNC: 9.5 MG/DL (ref 8.7–10.3)
CHLORIDE SERPL-SCNC: 99 MMOL/L (ref 96–106)
CHOLEST SERPL-MCNC: 165 MG/DL (ref 100–199)
CO2 SERPL-SCNC: 22 MMOL/L (ref 20–29)
CREAT SERPL-MCNC: 0.61 MG/DL (ref 0.57–1)
GLOBULIN SER CALC-MCNC: 2.3 G/DL (ref 1.5–4.5)
GLUCOSE SERPL-MCNC: 97 MG/DL (ref 65–99)
HDLC SERPL-MCNC: 76 MG/DL
IMP & REVIEW OF LAB RESULTS: NORMAL
LDLC SERPL CALC-MCNC: 76 MG/DL (ref 0–99)
POTASSIUM SERPL-SCNC: 4.5 MMOL/L (ref 3.5–5.2)
PROT SERPL-MCNC: 6.7 G/DL (ref 6–8.5)
SODIUM SERPL-SCNC: 136 MMOL/L (ref 134–144)
TRIGL SERPL-MCNC: 64 MG/DL (ref 0–149)
VLDLC SERPL CALC-MCNC: 13 MG/DL (ref 5–40)

## 2021-06-16 ENCOUNTER — HOSPITAL ENCOUNTER (OUTPATIENT)
Dept: MAMMOGRAPHY | Age: 71
Discharge: HOME | End: 2021-06-16
Attending: OBSTETRICS & GYNECOLOGY
Payer: MEDICARE

## 2021-06-16 DIAGNOSIS — Z12.31 SCREENING MAMMOGRAM FOR HIGH-RISK PATIENT: ICD-10-CM

## 2021-06-16 PROCEDURE — 77063 BREAST TOMOSYNTHESIS BI: CPT

## 2021-09-03 DIAGNOSIS — Z00.00 WELLNESS EXAMINATION: ICD-10-CM

## 2021-09-03 RX ORDER — VALACYCLOVIR HYDROCHLORIDE 500 MG/1
2000 TABLET, FILM COATED ORAL
Qty: 32 TABLET | Refills: 1 | Status: SHIPPED | OUTPATIENT
Start: 2021-09-03 | End: 2021-09-21

## 2021-09-14 ENCOUNTER — OFFICE VISIT (OUTPATIENT)
Dept: INTERNAL MEDICINE CLINIC | Age: 71
End: 2021-09-14
Payer: MEDICARE

## 2021-09-14 VITALS
BODY MASS INDEX: 22.5 KG/M2 | HEIGHT: 63 IN | TEMPERATURE: 98.4 F | HEART RATE: 71 BPM | DIASTOLIC BLOOD PRESSURE: 71 MMHG | RESPIRATION RATE: 14 BRPM | WEIGHT: 127 LBS | SYSTOLIC BLOOD PRESSURE: 115 MMHG | OXYGEN SATURATION: 97 %

## 2021-09-14 DIAGNOSIS — Z00.00 MEDICARE ANNUAL WELLNESS VISIT, SUBSEQUENT: Primary | ICD-10-CM

## 2021-09-14 DIAGNOSIS — E78.00 PURE HYPERCHOLESTEROLEMIA: ICD-10-CM

## 2021-09-14 DIAGNOSIS — Z71.89 ADVANCE DIRECTIVE DISCUSSED WITH PATIENT: ICD-10-CM

## 2021-09-14 DIAGNOSIS — E55.9 VITAMIN D DEFICIENCY: ICD-10-CM

## 2021-09-14 PROCEDURE — G8427 DOCREV CUR MEDS BY ELIG CLIN: HCPCS | Performed by: INTERNAL MEDICINE

## 2021-09-14 PROCEDURE — G9899 SCRN MAM PERF RSLTS DOC: HCPCS | Performed by: INTERNAL MEDICINE

## 2021-09-14 PROCEDURE — 3017F COLORECTAL CA SCREEN DOC REV: CPT | Performed by: INTERNAL MEDICINE

## 2021-09-14 PROCEDURE — G8536 NO DOC ELDER MAL SCRN: HCPCS | Performed by: INTERNAL MEDICINE

## 2021-09-14 PROCEDURE — G8510 SCR DEP NEG, NO PLAN REQD: HCPCS | Performed by: INTERNAL MEDICINE

## 2021-09-14 PROCEDURE — G8420 CALC BMI NORM PARAMETERS: HCPCS | Performed by: INTERNAL MEDICINE

## 2021-09-14 PROCEDURE — G8399 PT W/DXA RESULTS DOCUMENT: HCPCS | Performed by: INTERNAL MEDICINE

## 2021-09-14 PROCEDURE — G0439 PPPS, SUBSEQ VISIT: HCPCS | Performed by: INTERNAL MEDICINE

## 2021-09-14 PROCEDURE — 1101F PT FALLS ASSESS-DOCD LE1/YR: CPT | Performed by: INTERNAL MEDICINE

## 2021-09-14 NOTE — PROGRESS NOTES
This is the Subsequent Medicare Annual Wellness Exam, performed 12 months or more after the Initial AWV or the last Subsequent AWV    I have reviewed the patient's medical history in detail and updated the computerized patient record. Assessment/Plan   Education and counseling provided:  Are appropriate based on today's review and evaluation   Advanced directive discussed with patient. Just completed a new on. Will have  bring at visit next month. 1. Medicare annual wellness visit, subsequent  -     diph,pertuss,acel,,tetanus vac,PF, (ADACEL) 2 Lf-(2.5-5-3-5 mcg)-5Lf/0.5 mL syrg vaccine; 0.5 mL by IntraMUSCular route once for 1 dose., Print, Disp-0.5 mL, R-0  2. Advance directive discussed with patient  3. Pure hypercholesterolemia  -     LIPID PANEL; Future  4. Vitamin D deficiency  -     VITAMIN D, 25 HYDROXY; Future       Depression Risk Factor Screening     3 most recent PHQ Screens 9/14/2021   Little interest or pleasure in doing things Not at all   Feeling down, depressed, irritable, or hopeless Not at all   Total Score PHQ 2 0       Alcohol Risk Screen    Do you average more than 1 drink per night or more than 7 drinks a week:  No    On any one occasion in the past three months have you have had more than 3 drinks containing alcohol:  No        Functional Ability and Level of Safety    Hearing: Hearing is good. Activities of Daily Living: The home contains: no safety equipment. Patient does total self care      Ambulation: with no difficulty     Fall Risk:  Fall Risk Assessment, last 12 mths 9/14/2021   Able to walk? Yes   Fall in past 12 months? -   Do you feel unsteady? 0   Are you worried about falling 0   Number of falls in past 12 months -   Fall with injury? -      Abuse Screen:  Patient is not abused     ROS:  utd eye exam  Mild right TMJ  Continues to exercise regularly  Managing GERD with her diet. Mild urge incontinence - Kegel exercises are helping.     ? Frozen right shoulder. Has lost 5 lbs. Working out 3-4 days a week. Visit Vitals  /71 (BP 1 Location: Left arm, BP Patient Position: Sitting, BP Cuff Size: Adult)   Pulse 71   Temp 98.4 °F (36.9 °C) (Temporal)   Resp 14   Ht 5' 3\" (1.6 m)   Wt 127 lb (57.6 kg)   SpO2 97%   BMI 22.50 kg/m²     PE:   NECK: supple, no lad, no bruit, no tm  LUNGS: cta bilat  CV rrr, no m/g/r  Breast exam - nontender, no masses, no nipple retraction, no skin changes, no axillary lad. ABD: soft, nt, nd, nabs  EXT: no c/c/e    Cognitive Screening    Has your family/caregiver stated any concerns about your memory: no     Cognitive Screening: Normal -      Health Maintenance Due     Health Maintenance Due   Topic Date Due    DTaP/Tdap/Td series (2 - Td or Tdap) Prescription given    Flu Vaccine (1) 09/01/2021    Medicare Yearly Exam  09/14/2022       Patient Care Team   Patient Care Team:  Darell Holden MD as PCP - General (Internal Medicine)  Darell Holden MD [facility] PCP [facility]  Naomi Thomson OD as Physician (Optometry)    History     Patient Active Problem List   Diagnosis Code    HSV (herpes simplex virus) infection B00.9    Menopause Z78.0    Osteopenia M85.80    Rosacea L71.9    Hyperlipidemia E78.5     Past Medical History:   Diagnosis Date    Allergic rhinitis     GERD (gastroesophageal reflux disease)     HSV infection     Hypercholesterolemia     Osteopenia     Rosacea     Vitamin D deficiency       Past Surgical History:   Procedure Laterality Date    ENDOSCOPY, COLON, DIAGNOSTIC  2/2007, 2013    ([facility])-f/u 5 yrs. Current Outpatient Medications   Medication Sig Dispense Refill    diph,pertuss,acel,,tetanus vac,PF, (ADACEL) 2 Lf-(2.5-5-3-5 mcg)-5Lf/0.5 mL syrg vaccine 0.5 mL by IntraMUSCular route once for 1 dose. 0.5 mL 0    valACYclovir (Valtrex) 500 mg tablet Take 4 Tablets by mouth every twelve (12) hours as needed (outbreak).  Take as needed for herpes simplex for 2 doses. 32 Tablet 1    spironolactone (ALDACTONE) 100 mg tablet TAKE ONE TABLET BY MOUTH DAILY 90 Tab 3    finasteride (PROSCAR) 5 mg tablet TAKE ONE TABLET BY MOUTH DAILY 90 Tab 3    simvastatin (ZOCOR) 40 mg tablet TAKE 1 TABLET BY MOUTH NIGHTLY 90 Tab 3    Cholecalciferol, Vitamin D3, (VITAMIN D3) 1,000 unit cap Take  by mouth. 2 tablets by mouth daily      melatonin 5 mg Tab Take 5 mg by mouth nightly. 1-2 tablets by mouth daily      cetirizine (ZYRTEC) 10 mg tablet Take 1 Tab by mouth daily. 30 Tab 0     Allergies   Allergen Reactions    Merthiolate (Thimerosal) Unknown (comments)       Family History   Problem Relation Age of Onset    Heart Disease Mother 48        CAD   Sherrel Delmy Hypertension Mother     Elevated Lipids Mother     High Cholesterol Mother     Heart Disease Father 48        CAD    Hypertension Father    Sherrel Delmy Elevated Lipids Father     High Cholesterol Father     Liver Disease Paternal Grandfather         hemachromatosis     Social History     Tobacco Use    Smoking status: Never Smoker    Smokeless tobacco: Never Used   Substance Use Topics    Alcohol use:  Yes     Alcohol/week: 7.0 standard drinks     Types: 7 Standard drinks or equivalent per week         Chance Hall MD

## 2021-09-14 NOTE — PATIENT INSTRUCTIONS
Medicare Wellness Visit, Female     The best way to live healthy is to have a lifestyle where you eat a well-balanced diet, exercise regularly, limit alcohol use, and quit all forms of tobacco/nicotine, if applicable. Regular preventive services are another way to keep healthy. Preventive services (vaccines, screening tests, monitoring & exams) can help personalize your care plan, which helps you manage your own care. Screening tests can find health problems at the earliest stages, when they are easiest to treat. [facility] follows the current, evidence-based guidelines published by the [facility] ([facility]) when recommending preventive services for our patients. Because we follow these guidelines, sometimes recommendations change over time as research supports it. (For example, mammograms used to be recommended annually. Even though Medicare will still pay for an annual mammogram, the newer guidelines recommend a mammogram every two years for women of average risk). Of course, you and your doctor may decide to screen more often for some diseases, based on your risk and your co-morbidities (chronic disease you are already diagnosed with). Preventive services for you include:  - Medicare offers their members a free annual wellness visit, which is time for you and your primary care provider to discuss and plan for your preventive service needs. Take advantage of this benefit every year!  -All adults over the age of 72 should receive the recommended pneumonia vaccines. Current USPSTF guidelines recommend a series of two vaccines for the best pneumonia protection.   -All adults should have a flu vaccine yearly and a tetanus vaccine every 10 years.   -All adults age 48 and older should receive the shingles vaccines (series of two vaccines).       -All adults age 38-68 who are overweight should have a diabetes screening test once every three years.   -All adults born between [birth year] and 1965 should be screened once for Hepatitis C.  -Other screening tests and preventive services for persons with diabetes include: an eye exam to screen for diabetic retinopathy, a kidney function test, a foot exam, and stricter control over your cholesterol.   -Cardiovascular screening for adults with routine risk involves an electrocardiogram (ECG) at intervals determined by your doctor.   -Colorectal cancer screenings should be done for adults age 54-65 with no increased risk factors for colorectal cancer. There are a number of acceptable methods of screening for this type of cancer. Each test has its own benefits and drawbacks. Discuss with your doctor what is most appropriate for you during your annual wellness visit. The different tests include: colonoscopy (considered the best screening method), a fecal occult blood test, a fecal DNA test, and sigmoidoscopy.    -A bone mass density test is recommended when a woman turns 65 to screen for osteoporosis. This test is only recommended one time, as a screening. Some providers will use this same test as a disease monitoring tool if you already have osteoporosis. -Breast cancer screenings are recommended every other year for women of normal risk, age 54-69.  -Cervical cancer screenings for women over age 72 are only recommended with certain risk factors.      Here is a list of your current Health Maintenance items (your personalized list of preventive services) with a due date:      Health Maintenance Due   Topic Date Due    DTaP/Tdap/Td series (2 - Td or Tdap) Prescription given    Flu Vaccine (1) 09/01/2021    Medicare Yearly Exam  09/14/2022

## 2021-09-14 NOTE — PROGRESS NOTES
Reviewed record in preparation for visit and have obtained necessary documentation. Identified pt with two pt identifiers(name and ). Chief Complaint   Patient presents with    Complete Physical       Health Maintenance Due   Topic Date Due    DTaP/Tdap/Td  (2 - Td or Tdap) 2021    Yearly Flu Vaccine (1) 2021    Annual Well Visit  2021       Ms. Mary Ann Díaz has a reminder for a \"due or due soon\" health maintenance. I have asked that she discuss this further with her primary care provider for follow-up on this health maintenance. Coordination of Care Questionnaire:  :     1) Have you been to an emergency room, urgent care clinic since your last visit? no   Hospitalized since your last visit? no             2) Have you seen or consulted any other health care providers outside of [address] [place] [address] since your last visit? no  (Include any pap smears or colon screenings in this section.)    3) In the event something were to happen to you and you were unable to speak on your behalf, do you have an Advance Directive/ Living Will in place stating your wishes?  YES

## 2021-09-21 DIAGNOSIS — Z00.00 WELLNESS EXAMINATION: ICD-10-CM

## 2021-09-21 RX ORDER — VALACYCLOVIR HYDROCHLORIDE 500 MG/1
TABLET, FILM COATED ORAL
Qty: 32 TABLET | Refills: 1 | Status: SHIPPED | OUTPATIENT
Start: 2021-09-21 | End: 2022-10-09

## 2021-10-24 RX ORDER — SIMVASTATIN 40 MG/1
TABLET, FILM COATED ORAL
Qty: 90 TABLET | Refills: 3 | Status: SHIPPED | OUTPATIENT
Start: 2021-10-24 | End: 2022-10-31

## 2021-11-28 RX ORDER — FINASTERIDE 5 MG/1
TABLET, FILM COATED ORAL
Qty: 90 TABLET | Refills: 3 | Status: SHIPPED | OUTPATIENT
Start: 2021-11-28

## 2022-02-24 RX ORDER — SPIRONOLACTONE 100 MG/1
TABLET, FILM COATED ORAL
Qty: 90 TABLET | Refills: 3 | Status: SHIPPED | OUTPATIENT
Start: 2022-02-24

## 2022-03-01 RX ORDER — FLUTICASONE PROPIONATE 50 MCG
2 SPRAY, SUSPENSION (ML) NASAL DAILY
Qty: 1 EACH | Refills: 5 | Status: SHIPPED | OUTPATIENT
Start: 2022-03-01 | End: 2022-09-14 | Stop reason: ALTCHOICE

## 2022-03-14 ENCOUNTER — OFFICE VISIT (OUTPATIENT)
Dept: INTERNAL MEDICINE CLINIC | Age: 72
End: 2022-03-14
Payer: MEDICARE

## 2022-03-14 VITALS
HEART RATE: 79 BPM | RESPIRATION RATE: 14 BRPM | SYSTOLIC BLOOD PRESSURE: 118 MMHG | DIASTOLIC BLOOD PRESSURE: 74 MMHG | HEIGHT: 63 IN | OXYGEN SATURATION: 97 % | TEMPERATURE: 98.2 F | WEIGHT: 129.6 LBS | BODY MASS INDEX: 22.96 KG/M2

## 2022-03-14 DIAGNOSIS — E78.00 PURE HYPERCHOLESTEROLEMIA: Primary | ICD-10-CM

## 2022-03-14 PROCEDURE — G0463 HOSPITAL OUTPT CLINIC VISIT: HCPCS | Performed by: INTERNAL MEDICINE

## 2022-03-14 PROCEDURE — G8399 PT W/DXA RESULTS DOCUMENT: HCPCS | Performed by: INTERNAL MEDICINE

## 2022-03-14 PROCEDURE — G8420 CALC BMI NORM PARAMETERS: HCPCS | Performed by: INTERNAL MEDICINE

## 2022-03-14 PROCEDURE — 1090F PRES/ABSN URINE INCON ASSESS: CPT | Performed by: INTERNAL MEDICINE

## 2022-03-14 PROCEDURE — G9899 SCRN MAM PERF RSLTS DOC: HCPCS | Performed by: INTERNAL MEDICINE

## 2022-03-14 PROCEDURE — G8510 SCR DEP NEG, NO PLAN REQD: HCPCS | Performed by: INTERNAL MEDICINE

## 2022-03-14 PROCEDURE — G8427 DOCREV CUR MEDS BY ELIG CLIN: HCPCS | Performed by: INTERNAL MEDICINE

## 2022-03-14 PROCEDURE — G8536 NO DOC ELDER MAL SCRN: HCPCS | Performed by: INTERNAL MEDICINE

## 2022-03-14 PROCEDURE — 99213 OFFICE O/P EST LOW 20 MIN: CPT | Performed by: INTERNAL MEDICINE

## 2022-03-14 PROCEDURE — 1101F PT FALLS ASSESS-DOCD LE1/YR: CPT | Performed by: INTERNAL MEDICINE

## 2022-03-14 PROCEDURE — 3017F COLORECTAL CA SCREEN DOC REV: CPT | Performed by: INTERNAL MEDICINE

## 2022-03-14 NOTE — PROGRESS NOTES
Reviewed record in preparation for visit and have obtained necessary documentation. Identified pt with two pt identifiers(name and ). Chief Complaint   Patient presents with    Follow-up     Vitals:    22 1317   BP: 118/74   Pulse: 79   Resp: 14   Temp: 98.2 °F (36.8 °C)   TempSrc: Temporal   SpO2: 97%   Weight: 129 lb 9.6 oz (58.8 kg)   Height: 5' 3\" (1.6 m)        Health Maintenance Due   Topic Date Due    COVID-19 Vaccine (3 - Booster for Moderna series) 2021    DTaP/Tdap/Td  (2 - Td or Tdap) 2021    Yearly Flu Vaccine (1) 2021       Ms. Malik Long has a reminder for a \"due or due soon\" health maintenance. I have asked that she discuss this further with her primary care provider for follow-up on this health maintenance. Coordination of Care Questionnaire:  :     1) Have you been to an emergency room, urgent care clinic since your last visit? no   Hospitalized since your last visit? no             2) Have you seen or consulted any other health care providers outside of [address] [place] [address] since your last visit? no  (Include any pap smears or colon screenings in this section.)    3) In the event something were to happen to you and you were unable to speak on your behalf, do you have an Advance Directive/ Living Will in place stating your wishes? YES    Do you have an Advance Directive on file? no    4) Are you interested in receiving information on Advance Directives? no    Patient is accompanied by self I have received verbal consent from Germán Hirsch to discuss any/all medical information while they are present in the room.

## 2022-03-14 NOTE — PROGRESS NOTES
Subjective:   Jaky Hu is a 80 y.o. female who is seen for follow up of hyperlipidemia. Cardiovascular risk analysis - 80 y.o. female LDL goal is under 130. Compliance with treatment thus far has been good. ROS: taking medications as instructed, no medication side effects noted, no TIA's, no chest pain on exertion, no dyspnea on exertion, no swelling of ankles, no orthostatic dizziness or lightheadedness, no palpitations. New concerns:   Going to [facility] next week. Going to restart at the gym when returns. .   Continued sinus and nasal congestions. Very dry nares. Using vaseline at night. Using saline ns. 2 weeks on Flonase. Current Outpatient Medications   Medication Sig Dispense Refill    fluticasone propionate (FLONASE) 50 mcg/actuation nasal spray 2 Sprays by Both Nostrils route daily. 1 Each 5    spironolactone (ALDACTONE) 100 mg tablet TAKE ONE TABLET BY MOUTH DAILY 90 Tablet 3    finasteride (PROSCAR) 5 mg tablet TAKE ONE TABLET BY MOUTH DAILY 90 Tablet 3    simvastatin (ZOCOR) 40 mg tablet TAKE ONE TABLET BY MOUTH ONCE NIGHTLY 90 Tablet 3    valACYclovir (VALTREX) 500 mg tablet TAKE FOUR TABLETS BY MOUTH EVERY 12 HOURS AS NEEDED FOR OUTBREAK FOR 2 DOSES 32 Tablet 1    Cholecalciferol, Vitamin D3, (VITAMIN D3) 1,000 unit cap Take  by mouth. 2 tablets by mouth daily      melatonin 5 mg Tab Take 5 mg by mouth nightly. 1-2 tablets by mouth daily      cetirizine (ZYRTEC) 10 mg tablet Take 1 Tab by mouth daily. 30 Tab 0      Lab Results   Component Value Date/Time    Cholesterol, total 169 10/18/2021 09:46 AM    HDL Cholesterol 69 10/18/2021 09:46 AM    LDL, calculated 83 10/18/2021 09:46 AM    LDL-C, External 116 04/21/2015 12:00 AM    Triglyceride 85 10/18/2021 09:46 AM    CHOL/HDL Ratio 2.4 10/18/2021 09:46 AM     Lab Results   Component Value Date/Time    ALT (SGPT) 22 04/12/2021 09:13 AM    Alk.  phosphatase 74 04/12/2021 09:13 AM    Bilirubin, direct 0.2 10/05/2020 09:48 AM Bilirubin, total 0.4 04/12/2021 09:13 AM    Albumin 4.4 04/12/2021 09:13 AM    Protein, total 6.7 04/12/2021 09:13 AM    PLATELET 857 81/39/9944 02:23 PM        Objective:     Visit Vitals  /74 (BP 1 Location: Left upper arm, BP Patient Position: Sitting, BP Cuff Size: Adult)   Pulse 79   Temp 98.2 °F (36.8 °C) (Temporal)   Resp 14   Ht 5' 3\" (1.6 m)   Wt 129 lb 9.6 oz (58.8 kg)   SpO2 97%   BMI 22.96 kg/m²      Appearance: alert, well appearing, and in no distress and oriented to person, place, and time. Andre Humphrey NECK: supple, no lad, no bruit, no tm  LUNGS: cta bilat  CV rrr, no m/g/r  ABD: soft, nt, nd, nabs  EXT: no c/c/e    Assessment/Plan:   Hyperlipidemia well controlled. current treatment plan is effective, no change in therapy. Orders Placed This Encounter    METABOLIC PANEL, COMPREHENSIVE    LIPID PANEL       .

## 2022-08-01 ENCOUNTER — TRANSCRIBE ORDER (OUTPATIENT)
Dept: SCHEDULING | Age: 72
End: 2022-08-01

## 2022-08-01 DIAGNOSIS — Z12.31 SCREENING MAMMOGRAM FOR HIGH-RISK PATIENT: Primary | ICD-10-CM

## 2022-08-19 ENCOUNTER — HOSPITAL ENCOUNTER (OUTPATIENT)
Dept: MAMMOGRAPHY | Age: 72
Discharge: HOME | End: 2022-08-19
Attending: OBSTETRICS & GYNECOLOGY
Payer: MEDICARE

## 2022-08-19 DIAGNOSIS — Z12.31 SCREENING MAMMOGRAM FOR HIGH-RISK PATIENT: ICD-10-CM

## 2022-08-19 PROCEDURE — 77063 BREAST TOMOSYNTHESIS BI: CPT

## 2022-09-14 ENCOUNTER — OFFICE VISIT (OUTPATIENT)
Dept: INTERNAL MEDICINE CLINIC | Age: 72
End: 2022-09-14
Payer: MEDICARE

## 2022-09-14 VITALS
DIASTOLIC BLOOD PRESSURE: 71 MMHG | SYSTOLIC BLOOD PRESSURE: 122 MMHG | TEMPERATURE: 98.2 F | BODY MASS INDEX: 22.61 KG/M2 | OXYGEN SATURATION: 97 % | WEIGHT: 127.6 LBS | HEART RATE: 75 BPM | HEIGHT: 63 IN | RESPIRATION RATE: 16 BRPM

## 2022-09-14 DIAGNOSIS — E78.00 PURE HYPERCHOLESTEROLEMIA: ICD-10-CM

## 2022-09-14 DIAGNOSIS — E55.9 VITAMIN D DEFICIENCY: ICD-10-CM

## 2022-09-14 DIAGNOSIS — J30.9 ALLERGIC RHINITIS, UNSPECIFIED SEASONALITY, UNSPECIFIED TRIGGER: ICD-10-CM

## 2022-09-14 DIAGNOSIS — Z00.00 MEDICARE ANNUAL WELLNESS VISIT, SUBSEQUENT: Primary | ICD-10-CM

## 2022-09-14 PROCEDURE — 1101F PT FALLS ASSESS-DOCD LE1/YR: CPT | Performed by: INTERNAL MEDICINE

## 2022-09-14 PROCEDURE — G8510 SCR DEP NEG, NO PLAN REQD: HCPCS | Performed by: INTERNAL MEDICINE

## 2022-09-14 PROCEDURE — G8420 CALC BMI NORM PARAMETERS: HCPCS | Performed by: INTERNAL MEDICINE

## 2022-09-14 PROCEDURE — G0439 PPPS, SUBSEQ VISIT: HCPCS | Performed by: INTERNAL MEDICINE

## 2022-09-14 PROCEDURE — G8427 DOCREV CUR MEDS BY ELIG CLIN: HCPCS | Performed by: INTERNAL MEDICINE

## 2022-09-14 PROCEDURE — G8399 PT W/DXA RESULTS DOCUMENT: HCPCS | Performed by: INTERNAL MEDICINE

## 2022-09-14 PROCEDURE — G9899 SCRN MAM PERF RSLTS DOC: HCPCS | Performed by: INTERNAL MEDICINE

## 2022-09-14 PROCEDURE — G8536 NO DOC ELDER MAL SCRN: HCPCS | Performed by: INTERNAL MEDICINE

## 2022-09-14 PROCEDURE — 3017F COLORECTAL CA SCREEN DOC REV: CPT | Performed by: INTERNAL MEDICINE

## 2022-09-14 NOTE — PROGRESS NOTES
Patient has been informed of any other discussion outside the parameters of the physical could result in other charges including a co pay, patient verbalized understanding. Reviewed record in preparation for visit and have obtained necessary documentation. Identified pt with two pt identifiers(name and ). Chief Complaint   Patient presents with    Annual Wellness Visit     Vitals:    22 1008   BP: 122/71   Pulse: 75   Resp: 16   Temp: 98.2 °F (36.8 °C)   TempSrc: Temporal   SpO2: 97%   Weight: 127 lb 9.6 oz (57.9 kg)   Height: 5' 3\" (1.6 m)        Health Maintenance Due   Topic Date Due    COVID-19 Vaccine (3 - Booster for Moderna series) 2021    DTaP/Tdap/Td  (2 - Td or Tdap) 2021    Yearly Flu Vaccine (1) 2022    Annual Well Visit  09/15/2022       Ms. Vincent Em has a reminder for a \"due or due soon\" health maintenance. I have asked that she discuss this further with her primary care provider for follow-up on this health maintenance. Coordination of Care Questionnaire:  :     1) Have you been to an emergency room, urgent care clinic since your last visit? no   Hospitalized since your last visit? no             2) Have you seen or consulted any other health care providers outside of [address] [place] [address] since your last visit? yes  (Include any pap smears or colon screenings in this section.)    3. For patients aged 39-70: Has the patient had a colonoscopy / FIT/ Cologuard? Yes - no Care Gap present      If the patient is female:  4. For patients aged 41-77: Has the patient had a mammogram within the past 2 years? Yes - no Care Gap present       5. For patients aged 21-65: Has the patient had a pap smear? NA - based on age or sex      In the event something were to happen to you and you were unable to speak on your behalf, do you have an Advance Directive/ Living Will in place stating your wishes?  YES    Do you have an Advance Directive on file? yes    6) Are you interested in receiving information on Advance Directives? no    Patient is accompanied by self I have received verbal consent from Philip Chavez to discuss any/all medical information while they are present in the room.

## 2022-09-14 NOTE — PROGRESS NOTES
This is the Subsequent Medicare Annual Wellness Exam, performed 12 months or more after the Initial AWV or the last Subsequent AWV    I have reviewed the patient's medical history in detail and updated the computerized patient record. Allergy symptoms. Stopped Flonase due to nose bleeds. Also stopped Claritin as felt was contributing. Still using saline nasal spray. Was having issues with nose bleeds, used the astronaut gel then changed to Vaseline. Seeing dermatologist for skin cancer screening. UTD TDAP last year. Received 2 COVID booster. Added weights to exercise routine. Assessment/Plan   Education and counseling provided:  Are appropriate based on today's review and evaluation  Advanced directive discussed with patient. 1. Medicare annual wellness visit, subsequent  2. Pure hypercholesterolemia  -     METABOLIC PANEL, COMPREHENSIVE; Future  -     LIPID PANEL; Future  3. Allergic rhinitis, unspecified seasonality, unspecified trigger  4.  Vitamin D deficiency  -     VITAMIN D, 25 HYDROXY; Future       Depression Risk Factor Screening     3 most recent PHQ Screens 9/14/2022   Little interest or pleasure in doing things Not at all   Feeling down, depressed, irritable, or hopeless Not at all   Total Score PHQ 2 0   Trouble falling or staying asleep, or sleeping too much Not at all   Feeling tired or having little energy Not at all   Poor appetite, weight loss, or overeating Not at all   Feeling bad about yourself - or that you are a failure or have let yourself or your family down Not at all   Trouble concentrating on things such as school, work, reading, or watching TV Not at all   Moving or speaking so slowly that other people could have noticed; or the opposite being so fidgety that others notice Not at all   Thoughts of being better off dead, or hurting yourself in some way Not at all   PHQ 9 Score 0   How difficult have these problems made it for you to do your work, take care of your home and get along with others Not difficult at all       Alcohol & Drug Abuse Risk Screen   Do you average more than 1 drink per night or more than 7 drinks a week?: (P) No  On any one occasion in the past three months have you had more than 3 drinks containing alcohol?: (P) No          Functional Ability and Level of Safety   Hearing:  Hearing: (P) Patient reports hearing is good    Activities of Daily Living: The home contains: (P) no safety equipment  Functional ADLs: (P) Patient does total self care   Ambulation:  Patient ambulates: (P) with no difficulty   Fall Risk:  Fall Risk Assessment, last 12 mths 9/14/2022   Able to walk? Yes   Fall in past 12 months? 0   Do you feel unsteady? 0   Are you worried about falling 0   Number of falls in past 12 months -   Fall with injury?  -     Abuse Screen:  Do you ever feel afraid of your partner?: No  Are you in a relationship with someone who physically or mentally threatens you?: No  Is it safe for you to go home?: Yes      Cognitive Screening   Has your family/caregiver stated any concerns about your memory?: (P) No   Cognitive Screening: Normal -      Health Maintenance Due     Health Maintenance Due   Topic Date Due    COVID-19 Vaccine (3 - Booster for Moderna series) Up to date    DTaP/Tdap/Td series (2 - Td or Tdap) Up to date    Flu Vaccine (1) 09/01/2022    Medicare Yearly Exam  09/14/2023       Patient Care Team   Patient Care Team:  Mya Anthony MD as PCP - General (Internal Medicine Physician)  Mya Anthony MD as PCP - [facility] Empaneled Provider  Linda Redding OD as Physician (Optometry)    History     Patient Active Problem List   Diagnosis Code    HSV (herpes simplex virus) infection B00.9    Menopause Z78.0    Osteopenia M85.80    Rosacea L71.9    Hyperlipidemia E78.5     Past Medical History:   Diagnosis Date    Allergic rhinitis     GERD (gastroesophageal reflux disease)     HSV infection     Hypercholesterolemia     Osteopenia Rosacea     Vitamin D deficiency       Past Surgical History:   Procedure Laterality Date    ENDOSCOPY, COLON, DIAGNOSTIC  2/2007, 2013    ([facility])-f/u 5 yrs. Current Outpatient Medications   Medication Sig Dispense Refill    spironolactone (ALDACTONE) 100 mg tablet TAKE ONE TABLET BY MOUTH DAILY 90 Tablet 3    finasteride (PROSCAR) 5 mg tablet TAKE ONE TABLET BY MOUTH DAILY 90 Tablet 3    simvastatin (ZOCOR) 40 mg tablet TAKE ONE TABLET BY MOUTH ONCE NIGHTLY 90 Tablet 3    valACYclovir (VALTREX) 500 mg tablet TAKE FOUR TABLETS BY MOUTH EVERY 12 HOURS AS NEEDED FOR OUTBREAK FOR 2 DOSES 32 Tablet 1    cholecalciferol (VITAMIN D3) 25 mcg (1,000 unit) cap Take  by mouth. 2 tablets by mouth daily      melatonin 5 mg Tab Take 5 mg by mouth nightly. 1-2 tablets by mouth daily       Allergies   Allergen Reactions    Merthiolate (Thimerosal) Unknown (comments)       Family History   Problem Relation Age of Onset    Heart Disease Mother 48        CAD    Hypertension Mother     Elevated Lipids Mother     High Cholesterol Mother     Heart Disease Father 48        CAD    Hypertension Father     Elevated Lipids Father     High Cholesterol Father     Liver Disease Paternal Grandfather         hemachromatosis     Social History     Tobacco Use    Smoking status: Never    Smokeless tobacco: Never   Substance Use Topics    Alcohol use:  Yes     Alcohol/week: 7.0 standard drinks     Types: 7 Standard drinks or equivalent per week         Gabby Woods MD

## 2022-09-14 NOTE — PATIENT INSTRUCTIONS
Medicare Wellness Visit, Female     The best way to live healthy is to have a lifestyle where you eat a well-balanced diet, exercise regularly, limit alcohol use, and quit all forms of tobacco/nicotine, if applicable. Regular preventive services are another way to keep healthy. Preventive services (vaccines, screening tests, monitoring & exams) can help personalize your care plan, which helps you manage your own care. Screening tests can find health problems at the earliest stages, when they are easiest to treat. [facility] follows the current, evidence-based guidelines published by the [facility] ([facility]) when recommending preventive services for our patients. Because we follow these guidelines, sometimes recommendations change over time as research supports it. (For example, mammograms used to be recommended annually. Even though Medicare will still pay for an annual mammogram, the newer guidelines recommend a mammogram every two years for women of average risk). Of course, you and your doctor may decide to screen more often for some diseases, based on your risk and your co-morbidities (chronic disease you are already diagnosed with). Preventive services for you include:  - Medicare offers their members a free annual wellness visit, which is time for you and your primary care provider to discuss and plan for your preventive service needs. Take advantage of this benefit every year!  -All adults over the age of 72 should receive the recommended pneumonia vaccines. Current USPSTF guidelines recommend a series of two vaccines for the best pneumonia protection.   -All adults should have a flu vaccine yearly and a tetanus vaccine every 10 years.   -All adults age 48 and older should receive the shingles vaccines (series of two vaccines).       -All adults age 38-68 who are overweight should have a diabetes screening test once every three years.   -All adults born between [birth year] and 1965 should be screened once for Hepatitis C.  -Other screening tests and preventive services for persons with diabetes include: an eye exam to screen for diabetic retinopathy, a kidney function test, a foot exam, and stricter control over your cholesterol.   -Cardiovascular screening for adults with routine risk involves an electrocardiogram (ECG) at intervals determined by your doctor.   -Colorectal cancer screenings should be done for adults age 54-65 with no increased risk factors for colorectal cancer. There are a number of acceptable methods of screening for this type of cancer. Each test has its own benefits and drawbacks. Discuss with your doctor what is most appropriate for you during your annual wellness visit. The different tests include: colonoscopy (considered the best screening method), a fecal occult blood test, a fecal DNA test, and sigmoidoscopy.    -A bone mass density test is recommended when a woman turns 65 to screen for osteoporosis. This test is only recommended one time, as a screening. Some providers will use this same test as a disease monitoring tool if you already have osteoporosis. -Breast cancer screenings are recommended every other year for women of normal risk, age 54-69.  -Cervical cancer screenings for women over age 72 are only recommended with certain risk factors.      Here is a list of your current Health Maintenance items (your personalized list of preventive services) with a due date:      Health Maintenance Due   Topic Date Due    COVID-19 Vaccine (3 - Booster for Moderna series) Up to date    DTaP/Tdap/Td series (2 - Td or Tdap) Up to date    Flu Vaccine (1) 09/01/2022    Medicare Yearly Exam  09/14/2023

## 2022-09-22 LAB
25(OH)D3+25(OH)D2 SERPL-MCNC: 40.9 NG/ML (ref 30–100)
ALBUMIN SERPL-MCNC: 4.7 G/DL (ref 3.7–4.7)
ALBUMIN/GLOB SERPL: 2.6 {RATIO} (ref 1.2–2.2)
ALP SERPL-CCNC: 82 IU/L (ref 44–121)
ALT SERPL-CCNC: 22 IU/L (ref 0–32)
AST SERPL-CCNC: 19 IU/L (ref 0–40)
BILIRUB SERPL-MCNC: 0.7 MG/DL (ref 0–1.2)
BUN SERPL-MCNC: 10 MG/DL (ref 8–27)
BUN/CREAT SERPL: 18 (ref 12–28)
CALCIUM SERPL-MCNC: 9.6 MG/DL (ref 8.7–10.3)
CHLORIDE SERPL-SCNC: 97 MMOL/L (ref 96–106)
CHOLEST SERPL-MCNC: 173 MG/DL (ref 100–199)
CO2 SERPL-SCNC: 23 MMOL/L (ref 20–29)
CREAT SERPL-MCNC: 0.56 MG/DL (ref 0.57–1)
EGFR: 98 ML/MIN/1.73
GLOBULIN SER CALC-MCNC: 1.8 G/DL (ref 1.5–4.5)
GLUCOSE SERPL-MCNC: 94 MG/DL (ref 65–99)
HDLC SERPL-MCNC: 75 MG/DL
IMP & REVIEW OF LAB RESULTS: NORMAL
LDLC SERPL CALC-MCNC: 85 MG/DL (ref 0–99)
POTASSIUM SERPL-SCNC: 4.2 MMOL/L (ref 3.5–5.2)
PROT SERPL-MCNC: 6.5 G/DL (ref 6–8.5)
SODIUM SERPL-SCNC: 133 MMOL/L (ref 134–144)
TRIGL SERPL-MCNC: 66 MG/DL (ref 0–149)
VLDLC SERPL CALC-MCNC: 13 MG/DL (ref 5–40)

## 2022-10-06 ENCOUNTER — TELEPHONE (OUTPATIENT)
Dept: INTERNAL MEDICINE CLINIC | Age: 72
End: 2022-10-06

## 2022-10-06 NOTE — TELEPHONE ENCOUNTER
Called. Day #5 and feeling better. Will hold Paxlovid as feeling better. Discussed masking recommendations, OTC meds.

## 2022-10-06 NOTE — TELEPHONE ENCOUNTER
Patient has tested positive for COVID. Symptoms started 10/1. Patient is wondering if she can still get paxlovid or if it is too late. She states that she has not had a fever at all, only cold-like symptoms.

## 2022-10-09 DIAGNOSIS — Z00.00 WELLNESS EXAMINATION: ICD-10-CM

## 2022-10-09 RX ORDER — VALACYCLOVIR HYDROCHLORIDE 500 MG/1
TABLET, FILM COATED ORAL
Qty: 32 TABLET | Refills: 1 | Status: SHIPPED | OUTPATIENT
Start: 2022-10-09

## 2022-10-31 RX ORDER — SIMVASTATIN 40 MG/1
TABLET, FILM COATED ORAL
Qty: 90 TABLET | Refills: 3 | Status: SHIPPED | OUTPATIENT
Start: 2022-10-31

## 2023-02-20 ENCOUNTER — TELEPHONE (OUTPATIENT)
Dept: INTERNAL MEDICINE CLINIC | Age: 73
End: 2023-02-20

## 2023-02-20 DIAGNOSIS — E78.00 PURE HYPERCHOLESTEROLEMIA: Primary | ICD-10-CM

## 2023-02-20 DIAGNOSIS — Z00.00 WELLNESS EXAMINATION: ICD-10-CM

## 2023-02-20 RX ORDER — SPIRONOLACTONE 100 MG/1
100 TABLET, FILM COATED ORAL DAILY
Qty: 90 TABLET | Refills: 3 | Status: SHIPPED | OUTPATIENT
Start: 2023-02-20

## 2023-02-20 RX ORDER — VALACYCLOVIR HYDROCHLORIDE 500 MG/1
TABLET, FILM COATED ORAL
Qty: 32 TABLET | Refills: 1 | Status: SHIPPED | OUTPATIENT
Start: 2023-02-20

## 2023-02-20 RX ORDER — FINASTERIDE 5 MG/1
5 TABLET, FILM COATED ORAL DAILY
Qty: 90 TABLET | Refills: 3 | Status: SHIPPED | OUTPATIENT
Start: 2023-02-20

## 2023-02-20 NOTE — TELEPHONE ENCOUNTER
----- Message from Debbie Willett LPN sent at 0/55/1046 12:47 PM EST -----  Regarding: FW: upcoming appointment     ----- Message -----  From: Kristal Grace  Sent: 2/20/2023  11:45 AM EST  To: Abel Watters  Subject: upcoming appointment                             I have an appointment with Dr. Alexa Randall on March 14. Could you please send the required test requirements to me for the [facility] lab.   Do I need to fast?    thank you,   Carlin Cabrera

## 2023-03-08 NOTE — PROGRESS NOTES
Emelina Jiménez, who was evaluated through a synchronous (real-time) audio-video encounter, and/or her healthcare decision maker, is aware that it is a billable service, which includes applicable co-pays, with coverage as determined by her insurance carrier. She provided verbal consent to proceed and patient identification was verified. This visit was conducted pursuant to the emergency declaration under the [facility], [facility] authority and the Dominick Jumo and [employer] Act. A caregiver was present when appropriate. Ability to conduct physical exam was limited. The patient was located at: Home: [address] [place] [address],[address] [id number]  The provider was located at: Home: [place]    --Astrid Madrid MD on 3/9/2023 at 4:13 PM             Subjective:   Emelina Jiménez is a 71 y.o. female who is seen for follow up of hyperlipidemia. Cardiovascular risk analysis - 71 y.o. female LDL goal is under 130. Compliance with treatment thus far has been good. ROS: taking medications as instructed, no medication side effects noted, no TIA's, no chest pain on exertion, no dyspnea on exertion, no swelling of ankles, no orthostatic dizziness or lightheadedness. New concerns:   Not exercising much post Xmas. Has increased her pelvic floor exercises. .   Going to [facility] month of April. 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 Colonoscopy scheduled beginning of MAy    Current Outpatient Medications   Medication Sig Dispense Refill    spironolactone (ALDACTONE) 100 mg tablet Take 1 Tablet by mouth daily. 90 Tablet 3    valACYclovir (VALTREX) 500 mg tablet TAKE FOUR TABLETS BY MOUTH EVERY 12 HOURS AS NEEDED FOR OUTBREAK FOR 2 DOSES 32 Tablet 1    finasteride (PROSCAR) 5 mg tablet Take 1 Tablet by mouth daily.  90 Tablet 3    simvastatin (ZOCOR) 40 mg tablet TAKE ONE TABLET BY MOUTH ONCE NIGHTLY 90 Tablet 3    cholecalciferol (VITAMIN D3) 25 mcg (1,000 unit) cap Take  by mouth. 2 tablets by mouth daily      melatonin 5 mg Tab Take 5 mg by mouth nightly. 1-2 tablets by mouth daily        Lab Results   Component Value Date/Time    Glucose 94 09/21/2022 08:52 AM    LDL, calculated 94 02/28/2023 09:35 AM    LDL, calculated 83 10/18/2021 09:46 AM    Creatinine 0.56 (L) 09/21/2022 08:52 AM      Lab Results   Component Value Date/Time    Cholesterol, total 185 02/28/2023 09:35 AM    HDL Cholesterol 77 02/28/2023 09:35 AM    LDL, calculated 94 02/28/2023 09:35 AM    LDL, calculated 83 10/18/2021 09:46 AM    LDL-C, External 116 04/21/2015 12:00 AM    Triglyceride 77 02/28/2023 09:35 AM    CHOL/HDL Ratio 2.4 10/18/2021 09:46 AM     Lab Results   Component Value Date/Time    ALT (SGPT) 22 09/21/2022 08:52 AM    Alk. phosphatase 82 09/21/2022 08:52 AM    Bilirubin, direct 0.2 10/05/2020 09:48 AM    Bilirubin, total 0.7 09/21/2022 08:52 AM    Albumin 4.7 09/21/2022 08:52 AM    Protein, total 6.5 09/21/2022 08:52 AM    PLATELET 890 38/80/4433 02:23 PM       Lab Results   Component Value Date/Time    GFR est non-AA 93 04/12/2021 09:13 AM    GFR est  04/12/2021 09:13 AM    Creatinine 0.56 (L) 09/21/2022 08:52 AM    BUN 10 09/21/2022 08:52 AM    Sodium 133 (L) 09/21/2022 08:52 AM    Potassium 4.2 09/21/2022 08:52 AM    Chloride 97 09/21/2022 08:52 AM    CO2 23 09/21/2022 08:52 AM        Objective: There were no vitals taken for this visit. No flowsheet data found. Appearance: alert, well appearing, and in no distress and oriented to person, place, and time. CVS exam   .NECK - nml appearance  PULM - nml rate and effort       Assessment/Plan:     Diagnoses and all orders for this visit:    1. Pure hypercholesterolemia - well controlled. Labs reviewed. Continue Simvastatin. Rosie Perez

## 2023-03-09 ENCOUNTER — VIRTUAL VISIT (OUTPATIENT)
Dept: INTERNAL MEDICINE CLINIC | Age: 73
End: 2023-03-09
Payer: MEDICARE

## 2023-03-09 DIAGNOSIS — E78.00 PURE HYPERCHOLESTEROLEMIA: Primary | ICD-10-CM

## 2023-03-09 PROCEDURE — G8427 DOCREV CUR MEDS BY ELIG CLIN: HCPCS | Performed by: INTERNAL MEDICINE

## 2023-03-09 PROCEDURE — G8420 CALC BMI NORM PARAMETERS: HCPCS | Performed by: INTERNAL MEDICINE

## 2023-03-09 PROCEDURE — G8399 PT W/DXA RESULTS DOCUMENT: HCPCS | Performed by: INTERNAL MEDICINE

## 2023-03-09 PROCEDURE — 1101F PT FALLS ASSESS-DOCD LE1/YR: CPT | Performed by: INTERNAL MEDICINE

## 2023-03-09 PROCEDURE — G8536 NO DOC ELDER MAL SCRN: HCPCS | Performed by: INTERNAL MEDICINE

## 2023-03-09 PROCEDURE — G8510 SCR DEP NEG, NO PLAN REQD: HCPCS | Performed by: INTERNAL MEDICINE

## 2023-03-09 PROCEDURE — 99213 OFFICE O/P EST LOW 20 MIN: CPT | Performed by: INTERNAL MEDICINE

## 2023-03-09 PROCEDURE — 1090F PRES/ABSN URINE INCON ASSESS: CPT | Performed by: INTERNAL MEDICINE

## 2023-03-09 PROCEDURE — 3017F COLORECTAL CA SCREEN DOC REV: CPT | Performed by: INTERNAL MEDICINE

## 2023-03-09 PROCEDURE — G9899 SCRN MAM PERF RSLTS DOC: HCPCS | Performed by: INTERNAL MEDICINE

## 2023-03-09 PROCEDURE — G0463 HOSPITAL OUTPT CLINIC VISIT: HCPCS | Performed by: INTERNAL MEDICINE

## 2023-03-09 NOTE — PROGRESS NOTES
Patient here for follow up cholesterol. No chief complaint on file. There were no vitals filed for this visit. Current Outpatient Medications on File Prior to Visit   Medication Sig Dispense Refill    spironolactone (ALDACTONE) 100 mg tablet Take 1 Tablet by mouth daily. 90 Tablet 3    valACYclovir (VALTREX) 500 mg tablet TAKE FOUR TABLETS BY MOUTH EVERY 12 HOURS AS NEEDED FOR OUTBREAK FOR 2 DOSES 32 Tablet 1    finasteride (PROSCAR) 5 mg tablet Take 1 Tablet by mouth daily. 90 Tablet 3    simvastatin (ZOCOR) 40 mg tablet TAKE ONE TABLET BY MOUTH ONCE NIGHTLY 90 Tablet 3    cholecalciferol (VITAMIN D3) 25 mcg (1,000 unit) cap Take  by mouth. 2 tablets by mouth daily      melatonin 5 mg Tab Take 5 mg by mouth nightly. 1-2 tablets by mouth daily       No current facility-administered medications on file prior to visit. Health Maintenance Due   Topic    COVID-19 Vaccine (3 - Booster for Moderna series)    DTaP/Tdap/Td series (2 - Td or Tdap)    Flu Vaccine (1)    Colorectal Cancer Screening Combo        1. \"Have you been to the ER, urgent care clinic since your last visit? Hospitalized since your last visit? \" No    2. \"Have you seen or consulted any other health care providers outside of the [address] [place] [address] since your last visit? \" Yes Dermatology      3. For patients aged 39-70: Has the patient had a colonoscopy / FIT/ Cologuard? No  Scheduled for May      If the patient is female:    4. For patients aged 41-77: Has the patient had a mammogram within the past 2 years? Yes - no Care Gap present      5. For patients aged 21-65: Has the patient had a pap smear?  NA - based on age or sex

## 2023-05-03 ENCOUNTER — TELEPHONE (OUTPATIENT)
Dept: INTERNAL MEDICINE CLINIC | Age: 73
End: 2023-05-03

## 2023-06-01 ENCOUNTER — OFFICE VISIT (OUTPATIENT)
Age: 73
End: 2023-06-01
Payer: MEDICARE

## 2023-06-01 VITALS
TEMPERATURE: 98.2 F | SYSTOLIC BLOOD PRESSURE: 121 MMHG | DIASTOLIC BLOOD PRESSURE: 71 MMHG | HEIGHT: 63 IN | RESPIRATION RATE: 12 BRPM | BODY MASS INDEX: 23.21 KG/M2 | WEIGHT: 131 LBS | HEART RATE: 73 BPM | OXYGEN SATURATION: 97 %

## 2023-06-01 DIAGNOSIS — R07.9 CHEST PAIN, UNSPECIFIED TYPE: Primary | ICD-10-CM

## 2023-06-01 PROCEDURE — 1090F PRES/ABSN URINE INCON ASSESS: CPT | Performed by: INTERNAL MEDICINE

## 2023-06-01 PROCEDURE — 99213 OFFICE O/P EST LOW 20 MIN: CPT | Performed by: INTERNAL MEDICINE

## 2023-06-01 PROCEDURE — 1036F TOBACCO NON-USER: CPT | Performed by: INTERNAL MEDICINE

## 2023-06-01 PROCEDURE — G8427 DOCREV CUR MEDS BY ELIG CLIN: HCPCS | Performed by: INTERNAL MEDICINE

## 2023-06-01 PROCEDURE — 1123F ACP DISCUSS/DSCN MKR DOCD: CPT | Performed by: INTERNAL MEDICINE

## 2023-06-01 PROCEDURE — 93005 ELECTROCARDIOGRAM TRACING: CPT | Performed by: INTERNAL MEDICINE

## 2023-06-01 PROCEDURE — 3017F COLORECTAL CA SCREEN DOC REV: CPT | Performed by: INTERNAL MEDICINE

## 2023-06-01 PROCEDURE — G8399 PT W/DXA RESULTS DOCUMENT: HCPCS | Performed by: INTERNAL MEDICINE

## 2023-06-01 PROCEDURE — 93010 ELECTROCARDIOGRAM REPORT: CPT | Performed by: INTERNAL MEDICINE

## 2023-06-01 PROCEDURE — G8420 CALC BMI NORM PARAMETERS: HCPCS | Performed by: INTERNAL MEDICINE

## 2023-06-01 SDOH — ECONOMIC STABILITY: TRANSPORTATION INSECURITY
IN THE PAST 12 MONTHS, HAS LACK OF TRANSPORTATION KEPT YOU FROM MEETINGS, WORK, OR FROM GETTING THINGS NEEDED FOR DAILY LIVING?: NO

## 2023-06-01 SDOH — ECONOMIC STABILITY: FOOD INSECURITY: WITHIN THE PAST 12 MONTHS, THE FOOD YOU BOUGHT JUST DIDN'T LAST AND YOU DIDN'T HAVE MONEY TO GET MORE.: NEVER TRUE

## 2023-06-01 SDOH — ECONOMIC STABILITY: HOUSING INSECURITY
IN THE LAST 12 MONTHS, WAS THERE A TIME WHEN YOU DID NOT HAVE A STEADY PLACE TO SLEEP OR SLEPT IN A SHELTER (INCLUDING NOW)?: NO

## 2023-06-01 SDOH — ECONOMIC STABILITY: FOOD INSECURITY: WITHIN THE PAST 12 MONTHS, YOU WORRIED THAT YOUR FOOD WOULD RUN OUT BEFORE YOU GOT MONEY TO BUY MORE.: NEVER TRUE

## 2023-06-01 SDOH — ECONOMIC STABILITY: INCOME INSECURITY: HOW HARD IS IT FOR YOU TO PAY FOR THE VERY BASICS LIKE FOOD, HOUSING, MEDICAL CARE, AND HEATING?: NOT HARD AT ALL

## 2023-06-01 NOTE — PROGRESS NOTES
Subjective:      Patient ID: Mary Hale is a 71 y.o. female. HPI  Here for chest pain. Messaged me yesterday after hainvg with 5 days of pain in left inner arm and then into left chest.  She describes the pain as a sharp stabbing pain lasting 1-2 seconds in rapid secession occurring 1-2 times per day. No episodes since Tuesday. No sob, dizziness, nausea, palpitations. Started while sitting on sofa. No increased activity with left arm. Did not radiate to back. Current Outpatient Medications:     vitamin D 25 MCG (1000 UT) CAPS, Take by mouth, Disp: , Rfl:     finasteride (PROSCAR) 5 MG tablet, Take 1 tablet by mouth daily, Disp: , Rfl:     melatonin 5 MG TABS tablet, Take 1 tablet by mouth, Disp: , Rfl:     simvastatin (ZOCOR) 40 MG tablet, TAKE ONE TABLET BY MOUTH ONCE NIGHTLY, Disp: , Rfl:     spironolactone (ALDACTONE) 100 MG tablet, Take 1 tablet by mouth daily, Disp: , Rfl:     valACYclovir (VALTREX) 500 MG tablet, TAKE FOUR TABLETS BY MOUTH EVERY 12 HOURS AS NEEDED FOR OUTBREAK FOR 2 DOSES, Disp: , Rfl:      Vitals:    06/01/23 1113   BP: 121/71   Pulse: 73   Resp: 12   Temp: 98.2 °F (36.8 °C)   SpO2: 97%      Review of Systems  See above  Objective:   Physical Exam  Vitals reviewed. Constitutional:       Appearance: Normal appearance. HENT:      Head: Normocephalic and atraumatic. Neck:      Vascular: No carotid bruit. Cardiovascular:      Rate and Rhythm: Normal rate and regular rhythm. Pulses: Normal pulses. Heart sounds: Normal heart sounds. Pulmonary:      Effort: Pulmonary effort is normal.      Breath sounds: Normal breath sounds. Chest:      Chest wall: Tenderness present. Abdominal:      General: Bowel sounds are normal. There is no distension. Palpations: Abdomen is soft. There is no mass. Tenderness: There is no abdominal tenderness. Musculoskeletal:      Cervical back: Neck supple. Comments: Left arm nontender.   Full rom and nml strength

## 2023-06-27 ENCOUNTER — TELEPHONE (OUTPATIENT)
Age: 73
End: 2023-06-27

## 2023-06-29 ENCOUNTER — TELEPHONE (OUTPATIENT)
Age: 73
End: 2023-06-29

## 2023-06-29 NOTE — TELEPHONE ENCOUNTER
----- Message from [facility] sent at 6/27/2023 12:11 PM EDT -----  Subject: Message to Provider    QUESTIONS  Information for Provider? Pt returned the call from the practice to   reschedule her 9/15/2023 appt.  Edinson can be reached at [phone].   ---------------------------------------------------------------------------  --------------  Marcel Paula INFO  [phone]; OK to leave message on voicemail  ---------------------------------------------------------------------------  --------------  SCRIPT ANSWERS  undefined

## 2023-06-29 NOTE — TELEPHONE ENCOUNTER
----- Message from [facility] sent at 6/27/2023 12:11 PM EDT -----  Subject: Message to Provider    QUESTIONS  Information for Provider? Pt returned the call from the practice to   reschedule her 9/15/2023 appt.  Pt can be reached at [phone].   ---------------------------------------------------------------------------  --------------  Rafi Wood INFO  [phone]; OK to leave message on voicemail  ---------------------------------------------------------------------------  --------------  SCRIPT ANSWERS  undefined

## 2023-07-05 RX ORDER — VALACYCLOVIR HYDROCHLORIDE 500 MG/1
TABLET, FILM COATED ORAL
Qty: 28 TABLET | Refills: 0 | Status: SHIPPED | OUTPATIENT
Start: 2023-07-05

## 2023-07-17 RX ORDER — VALACYCLOVIR HYDROCHLORIDE 500 MG/1
TABLET, FILM COATED ORAL
Qty: 84 TABLET | Refills: 0 | Status: SHIPPED | OUTPATIENT
Start: 2023-07-17

## 2023-09-05 ENCOUNTER — TELEPHONE (OUTPATIENT)
Age: 73
End: 2023-09-05

## 2023-09-05 DIAGNOSIS — E78.00 PURE HYPERCHOLESTEROLEMIA, UNSPECIFIED: Primary | ICD-10-CM

## 2023-09-05 DIAGNOSIS — E78.00 PURE HYPERCHOLESTEROLEMIA, UNSPECIFIED: ICD-10-CM

## 2023-09-05 DIAGNOSIS — E55.9 VITAMIN D DEFICIENCY, UNSPECIFIED: ICD-10-CM

## 2023-09-06 ENCOUNTER — TRANSCRIBE ORDERS (OUTPATIENT)
Facility: HOSPITAL | Age: 73
End: 2023-09-06

## 2023-09-06 DIAGNOSIS — Z12.31 SCREENING MAMMOGRAM FOR BREAST CANCER: Primary | ICD-10-CM

## 2023-09-15 ENCOUNTER — HOSPITAL ENCOUNTER (OUTPATIENT)
Facility: HOSPITAL | Age: 73
End: 2023-09-15
Attending: INTERNAL MEDICINE
Payer: MEDICARE

## 2023-09-15 VITALS — BODY MASS INDEX: 23.04 KG/M2 | HEIGHT: 63 IN | WEIGHT: 130 LBS

## 2023-09-15 DIAGNOSIS — Z12.31 SCREENING MAMMOGRAM FOR BREAST CANCER: ICD-10-CM

## 2023-09-15 PROCEDURE — 77063 BREAST TOMOSYNTHESIS BI: CPT

## 2023-09-22 LAB
25(OH)D3+25(OH)D2 SERPL-MCNC: 42 NG/ML (ref 30–100)
ALBUMIN SERPL-MCNC: 4.8 G/DL (ref 3.8–4.8)
ALBUMIN/GLOB SERPL: 2.4 {RATIO} (ref 1.2–2.2)
ALP SERPL-CCNC: 72 IU/L (ref 44–121)
ALT SERPL-CCNC: 26 IU/L (ref 0–32)
AST SERPL-CCNC: 24 IU/L (ref 0–40)
BILIRUB SERPL-MCNC: 0.6 MG/DL (ref 0–1.2)
BUN SERPL-MCNC: 14 MG/DL (ref 8–27)
BUN/CREAT SERPL: 23 (ref 12–28)
CALCIUM SERPL-MCNC: 9.4 MG/DL (ref 8.7–10.3)
CHLORIDE SERPL-SCNC: 100 MMOL/L (ref 96–106)
CHOLEST SERPL-MCNC: 174 MG/DL (ref 100–199)
CO2 SERPL-SCNC: 22 MMOL/L (ref 20–29)
CREAT SERPL-MCNC: 0.62 MG/DL (ref 0.57–1)
EGFRCR SERPLBLD CKD-EPI 2021: 95 ML/MIN/1.73
GLOBULIN SER CALC-MCNC: 2 G/DL (ref 1.5–4.5)
GLUCOSE SERPL-MCNC: 113 MG/DL (ref 70–99)
HDLC SERPL-MCNC: 77 MG/DL
IMP & REVIEW OF LAB RESULTS: NORMAL
LDLC SERPL CALC-MCNC: 87 MG/DL (ref 0–99)
POTASSIUM SERPL-SCNC: 4.8 MMOL/L (ref 3.5–5.2)
PROT SERPL-MCNC: 6.8 G/DL (ref 6–8.5)
SODIUM SERPL-SCNC: 136 MMOL/L (ref 134–144)
TRIGL SERPL-MCNC: 52 MG/DL (ref 0–149)
VLDLC SERPL CALC-MCNC: 10 MG/DL (ref 5–40)

## 2023-09-24 SDOH — HEALTH STABILITY: PHYSICAL HEALTH: ON AVERAGE, HOW MANY MINUTES DO YOU ENGAGE IN EXERCISE AT THIS LEVEL?: 40 MIN

## 2023-09-24 SDOH — HEALTH STABILITY: PHYSICAL HEALTH: ON AVERAGE, HOW MANY DAYS PER WEEK DO YOU ENGAGE IN MODERATE TO STRENUOUS EXERCISE (LIKE A BRISK WALK)?: 3 DAYS

## 2023-09-24 ASSESSMENT — LIFESTYLE VARIABLES
HAS A RELATIVE, FRIEND, DOCTOR, OR ANOTHER HEALTH PROFESSIONAL EXPRESSED CONCERN ABOUT YOUR DRINKING OR SUGGESTED YOU CUT DOWN: 0
HOW MANY STANDARD DRINKS CONTAINING ALCOHOL DO YOU HAVE ON A TYPICAL DAY: 1 OR 2
HAS A RELATIVE, FRIEND, DOCTOR, OR ANOTHER HEALTH PROFESSIONAL EXPRESSED CONCERN ABOUT YOUR DRINKING OR SUGGESTED YOU CUT DOWN: NO
HOW OFTEN DURING THE LAST YEAR HAVE YOU HAD A FEELING OF GUILT OR REMORSE AFTER DRINKING: NEVER
HOW OFTEN DURING THE LAST YEAR HAVE YOU FAILED TO DO WHAT WAS NORMALLY EXPECTED FROM YOU BECAUSE OF DRINKING: 0
HOW OFTEN DURING THE LAST YEAR HAVE YOU BEEN UNABLE TO REMEMBER WHAT HAPPENED THE NIGHT BEFORE BECAUSE YOU HAD BEEN DRINKING: 0
HOW OFTEN DURING THE LAST YEAR HAVE YOU NEEDED AN ALCOHOLIC DRINK FIRST THING IN THE MORNING TO GET YOURSELF GOING AFTER A NIGHT OF HEAVY DRINKING: 0
HOW MANY STANDARD DRINKS CONTAINING ALCOHOL DO YOU HAVE ON A TYPICAL DAY: 1
HOW OFTEN DO YOU HAVE A DRINK CONTAINING ALCOHOL: 5
HOW OFTEN DURING THE LAST YEAR HAVE YOU HAD A FEELING OF GUILT OR REMORSE AFTER DRINKING: 0
HAVE YOU OR SOMEONE ELSE BEEN INJURED AS A RESULT OF YOUR DRINKING: 0
HOW OFTEN DURING THE LAST YEAR HAVE YOU FOUND THAT YOU WERE NOT ABLE TO STOP DRINKING ONCE YOU HAD STARTED: NEVER
HOW OFTEN DURING THE LAST YEAR HAVE YOU FAILED TO DO WHAT WAS NORMALLY EXPECTED FROM YOU BECAUSE OF DRINKING: NEVER
HOW OFTEN DURING THE LAST YEAR HAVE YOU BEEN UNABLE TO REMEMBER WHAT HAPPENED THE NIGHT BEFORE BECAUSE YOU HAD BEEN DRINKING: NEVER
HAVE YOU OR SOMEONE ELSE BEEN INJURED AS A RESULT OF YOUR DRINKING: NO
HOW OFTEN DURING THE LAST YEAR HAVE YOU FOUND THAT YOU WERE NOT ABLE TO STOP DRINKING ONCE YOU HAD STARTED: 0
HOW OFTEN DURING THE LAST YEAR HAVE YOU NEEDED AN ALCOHOLIC DRINK FIRST THING IN THE MORNING TO GET YOURSELF GOING AFTER A NIGHT OF HEAVY DRINKING: NEVER
HOW OFTEN DO YOU HAVE A DRINK CONTAINING ALCOHOL: 4 OR MORE TIMES A WEEK
HOW OFTEN DO YOU HAVE SIX OR MORE DRINKS ON ONE OCCASION: 1

## 2023-09-24 ASSESSMENT — PATIENT HEALTH QUESTIONNAIRE - PHQ9
SUM OF ALL RESPONSES TO PHQ QUESTIONS 1-9: 0
2. FEELING DOWN, DEPRESSED OR HOPELESS: 0
SUM OF ALL RESPONSES TO PHQ QUESTIONS 1-9: 0
SUM OF ALL RESPONSES TO PHQ QUESTIONS 1-9: 0
1. LITTLE INTEREST OR PLEASURE IN DOING THINGS: 0
SUM OF ALL RESPONSES TO PHQ QUESTIONS 1-9: 0
SUM OF ALL RESPONSES TO PHQ9 QUESTIONS 1 & 2: 0

## 2023-09-27 ENCOUNTER — OFFICE VISIT (OUTPATIENT)
Age: 73
End: 2023-09-27
Payer: MEDICARE

## 2023-09-27 VITALS
RESPIRATION RATE: 16 BRPM | OXYGEN SATURATION: 99 % | WEIGHT: 130 LBS | TEMPERATURE: 97.7 F | DIASTOLIC BLOOD PRESSURE: 65 MMHG | HEART RATE: 75 BPM | HEIGHT: 63 IN | SYSTOLIC BLOOD PRESSURE: 113 MMHG | BODY MASS INDEX: 23.04 KG/M2

## 2023-09-27 DIAGNOSIS — E55.9 VITAMIN D DEFICIENCY, UNSPECIFIED: ICD-10-CM

## 2023-09-27 DIAGNOSIS — Z00.00 MEDICARE ANNUAL WELLNESS VISIT, SUBSEQUENT: Primary | ICD-10-CM

## 2023-09-27 DIAGNOSIS — E78.00 PURE HYPERCHOLESTEROLEMIA, UNSPECIFIED: ICD-10-CM

## 2023-09-27 PROCEDURE — G0439 PPPS, SUBSEQ VISIT: HCPCS | Performed by: INTERNAL MEDICINE

## 2023-09-27 PROCEDURE — 1123F ACP DISCUSS/DSCN MKR DOCD: CPT | Performed by: INTERNAL MEDICINE

## 2023-09-27 PROCEDURE — 3017F COLORECTAL CA SCREEN DOC REV: CPT | Performed by: INTERNAL MEDICINE

## 2023-09-27 RX ORDER — ESTRADIOL 0.1 MG/G
CREAM VAGINAL
COMMUNITY
Start: 2023-07-13

## 2023-09-27 RX ORDER — CETIRIZINE HYDROCHLORIDE 5 MG/1
5 TABLET ORAL DAILY
COMMUNITY

## 2023-10-28 RX ORDER — SIMVASTATIN 40 MG
TABLET ORAL
Qty: 90 TABLET | Refills: 3 | Status: SHIPPED | OUTPATIENT
Start: 2023-10-28

## 2023-11-07 ENCOUNTER — OFFICE VISIT (OUTPATIENT)
Age: 73
End: 2023-11-07
Payer: MEDICARE

## 2023-11-07 VITALS
HEART RATE: 83 BPM | WEIGHT: 128.2 LBS | DIASTOLIC BLOOD PRESSURE: 80 MMHG | BODY MASS INDEX: 22.71 KG/M2 | OXYGEN SATURATION: 96 % | HEIGHT: 63 IN | SYSTOLIC BLOOD PRESSURE: 118 MMHG

## 2023-11-07 DIAGNOSIS — R07.9 CHEST PAIN, UNSPECIFIED TYPE: Primary | ICD-10-CM

## 2023-11-07 DIAGNOSIS — Z82.49 FAMILY HISTORY OF EARLY CAD: ICD-10-CM

## 2023-11-07 DIAGNOSIS — E78.2 MIXED HYPERLIPIDEMIA: ICD-10-CM

## 2023-11-07 PROCEDURE — 93005 ELECTROCARDIOGRAM TRACING: CPT | Performed by: INTERNAL MEDICINE

## 2023-11-07 PROCEDURE — 99204 OFFICE O/P NEW MOD 45 MIN: CPT | Performed by: INTERNAL MEDICINE

## 2023-11-07 RX ORDER — CETIRIZINE HYDROCHLORIDE 5 MG/1
TABLET ORAL
COMMUNITY
End: 2023-11-07

## 2023-11-07 ASSESSMENT — PATIENT HEALTH QUESTIONNAIRE - PHQ9
2. FEELING DOWN, DEPRESSED OR HOPELESS: 0
SUM OF ALL RESPONSES TO PHQ QUESTIONS 1-9: 0
SUM OF ALL RESPONSES TO PHQ QUESTIONS 1-9: 0
1. LITTLE INTEREST OR PLEASURE IN DOING THINGS: 0
SUM OF ALL RESPONSES TO PHQ9 QUESTIONS 1 & 2: 0
SUM OF ALL RESPONSES TO PHQ QUESTIONS 1-9: 0
SUM OF ALL RESPONSES TO PHQ QUESTIONS 1-9: 0

## 2023-11-07 NOTE — PATIENT INSTRUCTIONS
You will be scheduled for a routine stress test after your appointment today. Please wear comfortable clothing (shorts or pants with a shirt or blouse) and walking/athletic shoes.   Do not eat or drink anything, except water, for at least 2 hours prior to your test.  Do take your scheduled medications prior to your test.

## 2023-11-07 NOTE — PROGRESS NOTES
[facility]: Laine Mishra  [phone]    History of Present Illness:  Ms. Bruce Covington is a 67 yo F with mixed hyperlipidemia, very strong family history of early coronary artery disease, had a scan that noted some coronary plaquing greater than eight years ago, she is on Spironolactone for prevention of hair loss and not hypertension, referred by Dr. Kp Aponte for cardiac evaluation. She was having some various symptoms, including chest discomfort that was a \"squeezing\" substernal pain that was on and off over the last few weeks, can happen with rest or exertion, lasting less than a minute at a time. Overall, her breathing has been stable. Back in June, she was having some \"shooting pain\" in her left arm. She is compensated on exam with clear lungs and no lower extremity edema. No prior cardiac history. Her EKG here is normal sinus rhythm, nonspecific ST-T wave abnormality. Fam hx. Father MI, mom MI both past 46s. Paternal side DM  Soc hx. No tobacco. Retired, 6 yrs institutional bond sales  Assessment and Plan:   1. Chest pain. Pain with typical and atypical features and some risk factors; will proceed with a treadmill stress test for further evaluation. If this is unrevealing, could consider musculoskeletal or GI etiology. 2. Mixed hyperlipidemia. Tolerating statin. Given her history of coronary plaquing, would recommend continuing the statin. I do not think she needs further checking of the coronary plaquing as a screening test and we discussed this. 3. Family history of early coronary artery disease. She  has a past medical history of Allergic rhinitis, GERD (gastroesophageal reflux disease), HSV infection, Hypercholesterolemia, Osteopenia, Rosacea, and Vitamin D deficiency. All other systems negative except as above.      PE  Vitals:    11/07/23 1411   BP: 118/80   Site: Left Upper Arm   Position: Sitting   Cuff Size: Medium Adult   Pulse: 83   SpO2: 96%   Weight: 58.2 kg (128 lb

## 2023-11-15 ENCOUNTER — ANCILLARY PROCEDURE (OUTPATIENT)
Age: 73
End: 2023-11-15
Payer: MEDICARE

## 2023-11-15 ENCOUNTER — TELEPHONE (OUTPATIENT)
Age: 73
End: 2023-11-15

## 2023-11-15 VITALS
DIASTOLIC BLOOD PRESSURE: 80 MMHG | HEART RATE: 93 BPM | HEIGHT: 62 IN | WEIGHT: 128 LBS | SYSTOLIC BLOOD PRESSURE: 130 MMHG | BODY MASS INDEX: 23.55 KG/M2

## 2023-11-15 DIAGNOSIS — R07.9 CHEST PAIN, UNSPECIFIED TYPE: ICD-10-CM

## 2023-11-15 DIAGNOSIS — E78.2 MIXED HYPERLIPIDEMIA: ICD-10-CM

## 2023-11-15 DIAGNOSIS — Z82.49 FAMILY HISTORY OF EARLY CAD: ICD-10-CM

## 2023-11-15 LAB
ECHO BSA: 1.59 M2
STRESS ANGINA INDEX: 0
STRESS BASELINE DIAS BP: 80 MMHG
STRESS BASELINE HR: 93 BPM
STRESS BASELINE ST DEPRESSION: 0 MM
STRESS BASELINE SYS BP: 130 MMHG
STRESS ESTIMATED WORKLOAD: 7 METS
STRESS EXERCISE DUR MIN: 4 MIN
STRESS EXERCISE DUR SEC: 44 SEC
STRESS O2 SAT PEAK: 97 %
STRESS O2 SAT REST: 98 %
STRESS PEAK DIAS BP: 80 MMHG
STRESS PEAK SYS BP: 170 MMHG
STRESS PERCENT HR ACHIEVED: 105 %
STRESS POST PEAK HR: 155 BPM
STRESS RATE PRESSURE PRODUCT: NORMAL BPM*MMHG
STRESS SR DUKE TREADMILL SCORE: 5
STRESS ST DEPRESSION: 0 MM
STRESS TARGET HR: 148 BPM

## 2023-11-15 PROCEDURE — 93017 CV STRESS TEST TRACING ONLY: CPT | Performed by: INTERNAL MEDICINE

## 2023-11-15 NOTE — TELEPHONE ENCOUNTER
----- Message from Ann Fuller MD sent at 11/15/2023 11:03 AM EST -----  Please let pt know stress test was normal. thx

## 2023-11-15 NOTE — TELEPHONE ENCOUNTER
Attempted to reach patient by telephone. A message was left for return call. Sent a [employer] message to patient with results.

## 2024-02-28 ENCOUNTER — OFFICE VISIT (OUTPATIENT)
Age: 74
End: 2024-02-28
Payer: MEDICARE

## 2024-02-28 VITALS
BODY MASS INDEX: 23.55 KG/M2 | SYSTOLIC BLOOD PRESSURE: 120 MMHG | HEIGHT: 62 IN | HEART RATE: 74 BPM | DIASTOLIC BLOOD PRESSURE: 60 MMHG | RESPIRATION RATE: 16 BRPM | WEIGHT: 128 LBS | OXYGEN SATURATION: 96 %

## 2024-02-28 DIAGNOSIS — Z82.49 FAMILY HISTORY OF EARLY CAD: ICD-10-CM

## 2024-02-28 DIAGNOSIS — R07.89 NON-CARDIAC CHEST PAIN: Primary | ICD-10-CM

## 2024-02-28 DIAGNOSIS — E78.2 MIXED HYPERLIPIDEMIA: ICD-10-CM

## 2024-02-28 PROCEDURE — 1090F PRES/ABSN URINE INCON ASSESS: CPT | Performed by: INTERNAL MEDICINE

## 2024-02-28 PROCEDURE — 93010 ELECTROCARDIOGRAM REPORT: CPT | Performed by: INTERNAL MEDICINE

## 2024-02-28 PROCEDURE — G8399 PT W/DXA RESULTS DOCUMENT: HCPCS | Performed by: INTERNAL MEDICINE

## 2024-02-28 PROCEDURE — 3017F COLORECTAL CA SCREEN DOC REV: CPT | Performed by: INTERNAL MEDICINE

## 2024-02-28 PROCEDURE — 93005 ELECTROCARDIOGRAM TRACING: CPT | Performed by: INTERNAL MEDICINE

## 2024-02-28 PROCEDURE — 1036F TOBACCO NON-USER: CPT | Performed by: INTERNAL MEDICINE

## 2024-02-28 PROCEDURE — 99213 OFFICE O/P EST LOW 20 MIN: CPT | Performed by: INTERNAL MEDICINE

## 2024-02-28 PROCEDURE — 1123F ACP DISCUSS/DSCN MKR DOCD: CPT | Performed by: INTERNAL MEDICINE

## 2024-02-28 PROCEDURE — G8420 CALC BMI NORM PARAMETERS: HCPCS | Performed by: INTERNAL MEDICINE

## 2024-02-28 PROCEDURE — G8484 FLU IMMUNIZE NO ADMIN: HCPCS | Performed by: INTERNAL MEDICINE

## 2024-02-28 PROCEDURE — G8427 DOCREV CUR MEDS BY ELIG CLIN: HCPCS | Performed by: INTERNAL MEDICINE

## 2024-02-28 RX ORDER — ROSUVASTATIN CALCIUM 20 MG/1
20 TABLET, COATED ORAL NIGHTLY
Qty: 90 TABLET | Refills: 3 | Status: SHIPPED | OUTPATIENT
Start: 2024-02-28

## 2024-02-28 ASSESSMENT — PATIENT HEALTH QUESTIONNAIRE - PHQ9
SUM OF ALL RESPONSES TO PHQ QUESTIONS 1-9: 0
1. LITTLE INTEREST OR PLEASURE IN DOING THINGS: 0
2. FEELING DOWN, DEPRESSED OR HOPELESS: 0
SUM OF ALL RESPONSES TO PHQ QUESTIONS 1-9: 0
SUM OF ALL RESPONSES TO PHQ9 QUESTIONS 1 & 2: 0
SUM OF ALL RESPONSES TO PHQ QUESTIONS 1-9: 0
SUM OF ALL RESPONSES TO PHQ QUESTIONS 1-9: 0

## 2024-02-28 NOTE — PROGRESS NOTES
[facility]: [place]  [phone]    History of Present Illness:  Ms. Tony is a 74 yo F with mixed hyperlipidemia, very strong family history of early coronary artery disease, had a scan that noted some coronary plaquing greater than eight years ago, she is on Spironolactone for prevention of hair loss and not hypertension    On her last visit, due to chest pain, proceeded with a stress test that was normal. She did recently follow with a new primary care at [facility] Dr. Aisha Thorpe in geriatrics. She did describe she had been having occasional episode of chest discomfort the last few months, could be substernal, but occurring at rest with no clear triggers or exacerbating factors.  Overall breathing has been stable.  She is active without restriction.  She is compensated on exam with clear lungs and no lower extremity edema.  There was also a question of whether to modify her statin.  Fam hx. Father MI, mom MI both past 50s. Paternal side DM  Soc hx. No tobacco. Retired, 6 yrs institutional bond sales  Assessment and Plan:  1. Chest pain. This is non exertional and atypical for cardiac etiology. Stress test a few months ago was normal.  At this point no additional cardiac evaluation indicated and she can follow here on an as needed basis.  Would consider musculoskeletal or GI etiology.  2. Mixed hyperlipidemia. She does have a history of coronary plaquing.  Her most recent LDL was 105 and I do think it is reasonable to go ahead and switch her statin to Crestor 20 mg once daily if this is not cost prohibitive. Can try for goal LDL less than 70.  3. Family history of early coronary artery disease.        She  has a past medical history of Allergic rhinitis, GERD (gastroesophageal reflux disease), HSV infection, Hypercholesterolemia, Osteopenia, Rosacea, and Vitamin D deficiency.    All other systems negative except as above.     PE  Vitals:    02/28/24 1039   BP: 120/60   Pulse: 74   Resp: 16   SpO2: 96%